# Patient Record
Sex: MALE | Race: WHITE | NOT HISPANIC OR LATINO | Employment: FULL TIME | URBAN - METROPOLITAN AREA
[De-identification: names, ages, dates, MRNs, and addresses within clinical notes are randomized per-mention and may not be internally consistent; named-entity substitution may affect disease eponyms.]

---

## 2023-04-27 ENCOUNTER — OFFICE VISIT (OUTPATIENT)
Dept: PRIMARY CARE | Facility: CLINIC | Age: 53
End: 2023-04-27
Payer: COMMERCIAL

## 2023-04-27 VITALS
TEMPERATURE: 98.7 F | SYSTOLIC BLOOD PRESSURE: 133 MMHG | BODY MASS INDEX: 38.22 KG/M2 | HEIGHT: 70 IN | WEIGHT: 267 LBS | DIASTOLIC BLOOD PRESSURE: 76 MMHG | OXYGEN SATURATION: 94 % | HEART RATE: 66 BPM

## 2023-04-27 DIAGNOSIS — E11.9 TYPE 2 DIABETES MELLITUS WITHOUT COMPLICATION, WITHOUT LONG-TERM CURRENT USE OF INSULIN (MULTI): ICD-10-CM

## 2023-04-27 DIAGNOSIS — Z12.5 SCREENING FOR MALIGNANT NEOPLASM OF PROSTATE: ICD-10-CM

## 2023-04-27 DIAGNOSIS — J01.00 ACUTE NON-RECURRENT MAXILLARY SINUSITIS: Primary | ICD-10-CM

## 2023-04-27 DIAGNOSIS — Z00.00 ROUTINE ADULT HEALTH MAINTENANCE: Primary | ICD-10-CM

## 2023-04-27 PROCEDURE — 3008F BODY MASS INDEX DOCD: CPT | Performed by: NURSE PRACTITIONER

## 2023-04-27 PROCEDURE — 99213 OFFICE O/P EST LOW 20 MIN: CPT | Performed by: NURSE PRACTITIONER

## 2023-04-27 PROCEDURE — 1036F TOBACCO NON-USER: CPT | Performed by: NURSE PRACTITIONER

## 2023-04-27 RX ORDER — TRIAMCINOLONE ACETONIDE 55 UG/1
2 SPRAY, METERED NASAL DAILY
Qty: 16.5 G | Refills: 5 | Status: SHIPPED | OUTPATIENT
Start: 2023-04-27 | End: 2024-04-26

## 2023-04-27 RX ORDER — AMOXICILLIN 875 MG/1
TABLET, FILM COATED ORAL
COMMUNITY
Start: 2023-04-23 | End: 2024-01-09 | Stop reason: ALTCHOICE

## 2023-04-27 RX ORDER — ASPIRIN 81 MG/1
1 TABLET ORAL DAILY
COMMUNITY
Start: 2019-03-12 | End: 2024-03-11 | Stop reason: SDUPTHER

## 2023-04-27 RX ORDER — FLUTICASONE PROPIONATE 50 MCG
1 SPRAY, SUSPENSION (ML) NASAL 2 TIMES DAILY
COMMUNITY
Start: 2017-04-18 | End: 2023-04-27 | Stop reason: ALTCHOICE

## 2023-04-27 RX ORDER — DOXYCYCLINE 100 MG/1
100 CAPSULE ORAL 2 TIMES DAILY
Qty: 20 CAPSULE | Refills: 0 | Status: SHIPPED | OUTPATIENT
Start: 2023-04-27 | End: 2023-05-07

## 2023-04-27 RX ORDER — CETIRIZINE HYDROCHLORIDE 10 MG/1
10 TABLET ORAL
COMMUNITY
Start: 2020-05-21 | End: 2023-04-27 | Stop reason: SDUPTHER

## 2023-04-27 RX ORDER — ATORVASTATIN CALCIUM 80 MG/1
80 TABLET, FILM COATED ORAL
COMMUNITY
Start: 2017-03-10 | End: 2024-03-11 | Stop reason: WASHOUT

## 2023-04-27 RX ORDER — IBUPROFEN 200 MG
CAPSULE ORAL
COMMUNITY
Start: 2021-12-02

## 2023-04-27 RX ORDER — ESCITALOPRAM OXALATE 10 MG/1
10 TABLET ORAL DAILY
COMMUNITY
Start: 2023-02-24 | End: 2024-03-11 | Stop reason: ALTCHOICE

## 2023-04-27 RX ORDER — CETIRIZINE HYDROCHLORIDE 10 MG/1
10 TABLET ORAL
Qty: 30 TABLET | Refills: 5 | Status: SHIPPED | OUTPATIENT
Start: 2023-04-27 | End: 2024-03-11 | Stop reason: ALTCHOICE

## 2023-04-27 RX ORDER — CYCLOBENZAPRINE HCL 10 MG
10 TABLET ORAL 3 TIMES DAILY PRN
COMMUNITY
Start: 2023-03-10 | End: 2024-03-11 | Stop reason: ALTCHOICE

## 2023-04-27 ASSESSMENT — ENCOUNTER SYMPTOMS
FATIGUE: 0
PALPITATIONS: 0
WHEEZING: 0
ABDOMINAL PAIN: 0
EYE REDNESS: 0
COUGH: 0
SHORTNESS OF BREATH: 0
EYE DISCHARGE: 0
HEADACHES: 1
CHILLS: 0
SINUS PRESSURE: 0
MYALGIAS: 1
DIZZINESS: 1
SORE THROAT: 1
FEVER: 0
RHINORRHEA: 1
FACIAL SWELLING: 0
DIARRHEA: 0
CHEST TIGHTNESS: 1
ARTHRALGIAS: 1
VOMITING: 0

## 2023-04-27 ASSESSMENT — PATIENT HEALTH QUESTIONNAIRE - PHQ9
1. LITTLE INTEREST OR PLEASURE IN DOING THINGS: NOT AT ALL
SUM OF ALL RESPONSES TO PHQ9 QUESTIONS 1 AND 2: 0
2. FEELING DOWN, DEPRESSED OR HOPELESS: NOT AT ALL

## 2023-04-27 NOTE — PATIENT INSTRUCTIONS
Stop amoxicillin  Start doxycycline  Dayquil  Zyrtec   Nasacort  Fluids  Rest  Ibuprofen  Call if sx worsen or not starting to get better in 2-3d    Return for wellness appt      I will communicate with you via [facility] regarding messages and results. If you need help with this, you can call the support line at [phone].    IT WAS A PLEASURE TO SEE YOU TODAY. THANK YOU FOR CHOOSING US FOR YOUR HEALTHCARE NEEDS.

## 2023-04-27 NOTE — PROGRESS NOTES
Subjective   Patient ID: Benjamín Sahu is a 53 y.o. male who presents for Nasal Congestion (Pt states he has congestion in his chest, runny nose, cough is dry, felt like fire was in his chest and moved up to his throat. Headache with pressure in left ear. Went to Urgent Care 04/21  on [address] Tried cough drops, lemon tea, nothing is working. Yellowish mucus in coming out his nose, very concern).  Last physical: due    HPI  Chest congestion, stuffy nose, runny nose-yellow, cough dry, feels like fire in chst to throat, HA, lf ear pressure, body aches, dizziness, ST since 4/19/23  Went to UC 4/21/23. Given amox. Covid test neg. Strep neg.    no sob, wheezing, tight upper chest, fever, chills, new loss of taste or smell, diarrhea, vomiting, nausea, abdominal pain, fatigue, weakness, red eye, rash, bruising, cyanosis, ear pain, post nasal drip, pain with deep breath, leg or foot swelling, calf pain  loss of appetite-yes, due to dry throat  fluids-yes  has urinated at least 3 times in 24hrs  Seasonal allergies-yes  Selftxt-cough drops, lemon tea, dayquil, nasal spray, breathing in salt water, vicks    No known exposure to COVID-19  No known exposure to strep  No known exposure to influenza  No one sick around the pt      Risk factors:  Chronic disease/comorbidities: cad, dm, obesity, glen  not a healthcare worker  Age: not 65yrs of age and older      No care team member to display     Review of Systems   Constitutional:  Negative for chills, fatigue and fever.   HENT:  Positive for congestion, rhinorrhea and sore throat. Negative for ear discharge, ear pain, facial swelling, postnasal drip and sinus pressure.         Lf ear pressure     Eyes:  Negative for discharge and redness.   Respiratory:  Positive for chest tightness. Negative for cough, shortness of breath and wheezing.    Cardiovascular:  Negative for chest pain, palpitations and leg swelling.   Gastrointestinal:  Negative for abdominal pain, diarrhea and  vomiting.   Musculoskeletal:  Positive for arthralgias and myalgias.   Skin:  Negative for rash.   Neurological:  Positive for dizziness and headaches.       Objective   Visit Vitals  /76   Pulse 66   Temp 37.1 °C (98.7 °F) (Oral)      BP Readings from Last 3 Encounters:   04/27/23 133/76   11/09/22 128/80   11/03/22 122/77     Wt Readings from Last 3 Encounters:   04/27/23 121 kg (267 lb)   11/21/22 123 kg (271 lb)   11/09/22 122 kg (268 lb)       Physical Exam  Constitutional:       Appearance: Normal appearance.   HENT:      Head: Normocephalic.      Right Ear: Tympanic membrane, ear canal and external ear normal.      Left Ear: Tympanic membrane, ear canal and external ear normal.      Nose: Nose normal.      Mouth/Throat:      Mouth: Mucous membranes are moist.      Pharynx: No oropharyngeal exudate or posterior oropharyngeal erythema.   Cardiovascular:      Rate and Rhythm: Normal rate and regular rhythm.      Heart sounds: Normal heart sounds.   Pulmonary:      Effort: Pulmonary effort is normal.      Breath sounds: Normal breath sounds. No wheezing, rhonchi or rales.   Lymphadenopathy:      Cervical: No cervical adenopathy.   Neurological:      Mental Status: He is alert.       Assessment/Plan   Diagnoses and all orders for this visit:  Acute non-recurrent maxillary sinusitis  -     doxycycline (Vibramycin) 100 mg capsule; Take 1 capsule (100 mg) by mouth 2 times a day for 10 days. Take with at least 8 ounces (large glass) of water, do not lie down for 30 minutes after

## 2023-09-27 PROBLEM — J38.3 DISORDER OF VOCAL CORDS: Status: ACTIVE | Noted: 2020-12-30

## 2023-09-27 PROBLEM — D22.60 MELANOCYTIC NEVI OF UNSPECIFIED UPPER LIMB, INCLUDING SHOULDER: Status: ACTIVE | Noted: 2021-02-01

## 2023-09-27 PROBLEM — E78.5 DYSLIPIDEMIA: Status: ACTIVE | Noted: 2021-06-03

## 2023-09-27 PROBLEM — I20.9 ANGINA PECTORIS (CMS-HCC): Status: ACTIVE | Noted: 2023-09-27

## 2023-09-27 PROBLEM — M25.579 CHRONIC ANKLE PAIN: Status: ACTIVE | Noted: 2023-09-27

## 2023-09-27 PROBLEM — G47.33 OSA (OBSTRUCTIVE SLEEP APNEA): Status: ACTIVE | Noted: 2021-06-03

## 2023-09-27 PROBLEM — J35.8 TONSIL ASYMMETRY: Status: ACTIVE | Noted: 2020-12-30

## 2023-09-27 PROBLEM — Q66.70 PES CAVUS: Status: ACTIVE | Noted: 2023-09-27

## 2023-09-27 PROBLEM — D84.9 IMMUNE DEFICIENCY DISORDER (MULTI): Status: ACTIVE | Noted: 2023-09-27

## 2023-09-27 PROBLEM — E66.9 OBESITY (BMI 30-39.9): Status: ACTIVE | Noted: 2023-04-10

## 2023-09-27 PROBLEM — F41.1 GENERALIZED ANXIETY DISORDER: Status: ACTIVE | Noted: 2021-07-14

## 2023-09-27 PROBLEM — R06.00 PND (PAROXYSMAL NOCTURNAL DYSPNEA): Status: ACTIVE | Noted: 2021-06-03

## 2023-09-27 PROBLEM — Z96.22 MYRINGOTOMY TUBE(S) STATUS: Status: ACTIVE | Noted: 2023-09-27

## 2023-09-27 PROBLEM — G56.03 CARPAL TUNNEL SYNDROME, BILATERAL: Status: ACTIVE | Noted: 2023-09-27

## 2023-09-27 PROBLEM — K92.2 GASTROINTESTINAL HEMORRHAGE: Status: ACTIVE | Noted: 2022-02-28

## 2023-09-27 PROBLEM — H92.01 OTALGIA, RIGHT EAR: Status: ACTIVE | Noted: 2023-09-27

## 2023-09-27 PROBLEM — G43.109 MIGRAINE WITH VISUAL AURA: Status: ACTIVE | Noted: 2023-09-27

## 2023-09-27 PROBLEM — Z86.010 PERSONAL HISTORY OF COLONIC POLYPS: Status: ACTIVE | Noted: 2021-06-03

## 2023-09-27 PROBLEM — J31.0 CHRONIC RHINITIS: Status: ACTIVE | Noted: 2023-09-27

## 2023-09-27 PROBLEM — H70.10 CHRONIC MASTOIDITIS: Status: ACTIVE | Noted: 2017-05-02

## 2023-09-27 PROBLEM — H25.099 INCIPIENT SENILE CATARACT: Status: ACTIVE | Noted: 2023-09-27

## 2023-09-27 PROBLEM — R26.89 ANTALGIC GAIT: Status: ACTIVE | Noted: 2023-09-27

## 2023-09-27 PROBLEM — F32.A DEPRESSION: Status: ACTIVE | Noted: 2023-09-27

## 2023-09-27 PROBLEM — G89.29 CHRONIC ANKLE PAIN: Status: ACTIVE | Noted: 2023-09-27

## 2023-09-27 PROBLEM — J30.9 ALLERGIC RHINITIS: Status: ACTIVE | Noted: 2023-09-27

## 2023-09-27 PROBLEM — M47.812 CERVICAL SPONDYLOSIS WITHOUT MYELOPATHY: Status: ACTIVE | Noted: 2023-09-27

## 2023-09-27 PROBLEM — I69.30 HISTORY OF STROKE WITH RESIDUAL EFFECTS: Status: ACTIVE | Noted: 2023-09-27

## 2023-09-27 PROBLEM — M26.629 TMJ PAIN DYSFUNCTION SYNDROME: Status: ACTIVE | Noted: 2023-09-27

## 2023-09-27 PROBLEM — G50.9 TRIGEMINAL NEUROPATHY: Status: ACTIVE | Noted: 2023-09-27

## 2023-09-27 PROBLEM — R29.898 IMPAIRED STRENGTH OF LOWER EXTREMITY: Status: ACTIVE | Noted: 2023-09-27

## 2023-09-27 PROBLEM — H93.12 SUBJECTIVE TINNITUS OF LEFT EAR: Status: ACTIVE | Noted: 2023-09-27

## 2023-09-27 PROBLEM — L57.9 SKIN CHANGES DUE TO CHRONIC EXPOSURE TO NONIONIZING RADIATION, UNSPECIFIED: Status: ACTIVE | Noted: 2021-02-01

## 2023-09-27 PROBLEM — L70.0 ACNE VULGARIS: Status: ACTIVE | Noted: 2021-02-01

## 2023-09-27 PROBLEM — D22.39 MELANOCYTIC NEVI OF OTHER PARTS OF FACE: Status: ACTIVE | Noted: 2021-02-01

## 2023-09-27 PROBLEM — D22.5 MELANOCYTIC NEVI OF TRUNK: Status: ACTIVE | Noted: 2021-02-01

## 2023-09-27 PROBLEM — I25.10 CORONARY ARTERY CALCIFICATION SEEN ON CT SCAN: Status: ACTIVE | Noted: 2023-09-27

## 2023-09-27 PROBLEM — R29.898: Status: ACTIVE | Noted: 2023-09-27

## 2023-09-27 PROBLEM — J30.0 VMR (VASOMOTOR RHINITIS): Status: ACTIVE | Noted: 2023-09-27

## 2023-09-27 PROBLEM — K63.5 COLON POLYP: Status: ACTIVE | Noted: 2019-04-18

## 2023-09-27 PROBLEM — M25.672 DECREASED RANGE OF MOTION OF LEFT ANKLE: Status: ACTIVE | Noted: 2023-09-27

## 2023-09-27 PROBLEM — M99.09 SEGMENTAL AND SOMATIC DYSFUNCTION: Status: ACTIVE | Noted: 2023-09-27

## 2023-09-27 PROBLEM — L92.9 GRANULOMA OF SKIN: Status: ACTIVE | Noted: 2023-09-27

## 2023-09-27 PROBLEM — M25.373 ANKLE INSTABILITY: Status: ACTIVE | Noted: 2023-09-27

## 2023-09-27 PROBLEM — H47.20 OPTIC ATROPHY OF RIGHT EYE: Status: ACTIVE | Noted: 2023-09-27

## 2023-09-27 PROBLEM — I25.10 ATHEROSCLEROSIS OF CORONARY ARTERY: Status: ACTIVE | Noted: 2021-06-03

## 2023-09-27 PROBLEM — E66.01 MORBID OBESITY (MULTI): Status: ACTIVE | Noted: 2023-09-27

## 2023-09-27 PROBLEM — M79.2 NEURITIS: Status: ACTIVE | Noted: 2023-09-27

## 2023-09-27 PROBLEM — M26.621 ARTHRALGIA OF RIGHT TEMPOROMANDIBULAR JOINT: Status: ACTIVE | Noted: 2017-05-02

## 2023-09-27 PROBLEM — G25.89 SCAPULAR DYSKINESIS: Status: ACTIVE | Noted: 2023-09-27

## 2023-09-27 PROBLEM — H90.0 CONDUCTIVE HEARING LOSS, BILATERAL: Status: ACTIVE | Noted: 2023-09-27

## 2023-09-27 PROBLEM — H66.93 CHRONIC OTITIS MEDIA OF BOTH EARS: Status: ACTIVE | Noted: 2023-09-27

## 2023-09-27 PROBLEM — E66.9 DIABETES MELLITUS TYPE 2 IN OBESE: Status: ACTIVE | Noted: 2023-09-27

## 2023-09-27 PROBLEM — H90.72 MIXED HEARING LOSS OF LEFT EAR: Status: ACTIVE | Noted: 2023-09-27

## 2023-09-27 PROBLEM — L98.0 PYOGENIC GRANULOMA: Status: ACTIVE | Noted: 2023-09-27

## 2023-09-27 PROBLEM — J32.9 CHRONIC SINUSITIS: Status: ACTIVE | Noted: 2023-09-27

## 2023-09-27 PROBLEM — L82.1 OTHER SEBORRHEIC KERATOSIS: Status: ACTIVE | Noted: 2021-02-01

## 2023-09-27 PROBLEM — H69.93 ETD (EUSTACHIAN TUBE DYSFUNCTION), BILATERAL: Status: ACTIVE | Noted: 2017-05-02

## 2023-09-27 PROBLEM — D48.5 NEOPLASM OF UNCERTAIN BEHAVIOR OF SKIN: Status: ACTIVE | Noted: 2021-02-01

## 2023-09-27 PROBLEM — E11.69 DIABETES MELLITUS TYPE 2 IN OBESE: Status: ACTIVE | Noted: 2023-09-27

## 2023-09-27 PROBLEM — L01.00 IMPETIGO: Status: ACTIVE | Noted: 2023-09-27

## 2023-09-27 PROBLEM — K21.9 GERD (GASTROESOPHAGEAL REFLUX DISEASE): Status: ACTIVE | Noted: 2019-04-18

## 2023-09-27 PROBLEM — M71.349 OTHER BURSAL CYST, UNSPECIFIED HAND: Status: ACTIVE | Noted: 2021-02-01

## 2023-09-27 PROBLEM — H47.011 ISCHEMIC OPTIC NEUROPATHY, RIGHT: Status: ACTIVE | Noted: 2023-09-27

## 2023-09-27 PROBLEM — H71.92 CHOLESTEATOMA, LEFT: Status: ACTIVE | Noted: 2023-09-27

## 2023-09-27 PROBLEM — Z86.0100 PERSONAL HISTORY OF COLONIC POLYPS: Status: ACTIVE | Noted: 2021-06-03

## 2023-09-27 PROBLEM — R94.39 ABNORMAL STRESS TEST: Status: ACTIVE | Noted: 2023-09-27

## 2023-09-27 PROBLEM — G89.29 CHRONIC PAIN: Status: ACTIVE | Noted: 2023-09-27

## 2023-09-27 PROBLEM — Z90.89 ACQUIRED ABSENCE OF OTHER ORGANS: Status: ACTIVE | Noted: 2017-05-21

## 2023-09-27 PROBLEM — F10.11 ALCOHOL ABUSE, IN REMISSION: Status: ACTIVE | Noted: 2021-09-30

## 2023-09-27 PROBLEM — G96.00 CSF LEAK: Status: ACTIVE | Noted: 2023-09-27

## 2023-09-27 RX ORDER — METOPROLOL SUCCINATE 25 MG/1
25 TABLET, EXTENDED RELEASE ORAL
COMMUNITY
Start: 2021-07-30 | End: 2024-03-11 | Stop reason: ALTCHOICE

## 2023-09-27 RX ORDER — IBUPROFEN 600 MG/1
600 TABLET ORAL AS NEEDED
COMMUNITY
Start: 2019-04-02 | End: 2024-03-11 | Stop reason: ALTCHOICE

## 2023-09-27 RX ORDER — ACETAMINOPHEN 500 MG
1 TABLET ORAL NIGHTLY
COMMUNITY
Start: 2023-04-10

## 2023-09-27 RX ORDER — OLOPATADINE HYDROCHLORIDE 2 MG/ML
SOLUTION OPHTHALMIC
COMMUNITY
Start: 2023-06-22

## 2023-09-27 RX ORDER — KETOCONAZOLE 20 MG/G
CREAM TOPICAL 2 TIMES DAILY
COMMUNITY
Start: 2018-10-17

## 2023-09-27 RX ORDER — TRETINOIN 0.5 MG/G
CREAM TOPICAL NIGHTLY
COMMUNITY
Start: 2023-08-07 | End: 2024-03-11 | Stop reason: ALTCHOICE

## 2023-09-27 RX ORDER — IBUPROFEN 800 MG/1
800 TABLET ORAL EVERY 8 HOURS PRN
COMMUNITY
End: 2024-03-11 | Stop reason: ALTCHOICE

## 2023-09-27 RX ORDER — NAPROXEN 500 MG/1
500 TABLET ORAL
COMMUNITY
End: 2024-03-11 | Stop reason: ALTCHOICE

## 2023-09-27 RX ORDER — MUPIROCIN 20 MG/G
OINTMENT TOPICAL
COMMUNITY
Start: 2023-07-08 | End: 2024-03-11 | Stop reason: ALTCHOICE

## 2023-09-27 RX ORDER — ISOSORBIDE MONONITRATE 30 MG/1
1 TABLET, EXTENDED RELEASE ORAL DAILY
COMMUNITY
Start: 2017-04-07 | End: 2024-03-11 | Stop reason: ALTCHOICE

## 2023-09-27 RX ORDER — DOXYCYCLINE 100 MG/1
100 CAPSULE ORAL 2 TIMES DAILY
COMMUNITY
Start: 2020-11-17 | End: 2024-01-09 | Stop reason: ALTCHOICE

## 2023-09-27 RX ORDER — MELOXICAM 7.5 MG/1
7.5 TABLET ORAL
COMMUNITY
Start: 2023-03-28 | End: 2024-03-11 | Stop reason: ALTCHOICE

## 2023-09-27 RX ORDER — ROSUVASTATIN CALCIUM 40 MG/1
1 TABLET, COATED ORAL DAILY
COMMUNITY
Start: 2023-02-24 | End: 2024-03-11 | Stop reason: ALTCHOICE

## 2023-09-27 RX ORDER — NITROGLYCERIN 0.4 MG/1
0.4 TABLET SUBLINGUAL
COMMUNITY
Start: 2018-01-02

## 2023-09-27 RX ORDER — CEFDINIR 300 MG/1
300 CAPSULE ORAL
COMMUNITY
Start: 2017-05-10 | End: 2024-03-11 | Stop reason: ALTCHOICE

## 2023-09-27 RX ORDER — METFORMIN HYDROCHLORIDE 500 MG/1
4 TABLET, EXTENDED RELEASE ORAL
COMMUNITY
Start: 2021-01-14 | End: 2024-03-11 | Stop reason: ALTCHOICE

## 2023-09-27 RX ORDER — CLINDAMYCIN PHOSPHATE 10 UG/ML
LOTION TOPICAL
COMMUNITY
Start: 2021-02-01

## 2023-09-27 RX ORDER — SUCRALFATE 1 G/1
1 TABLET ORAL 4 TIMES DAILY
COMMUNITY
Start: 2017-02-03 | End: 2024-03-11 | Stop reason: ALTCHOICE

## 2023-09-27 RX ORDER — TIZANIDINE 2 MG/1
2 TABLET ORAL EVERY 8 HOURS
COMMUNITY
Start: 2022-04-22 | End: 2024-03-11 | Stop reason: ALTCHOICE

## 2023-09-27 RX ORDER — PANTOPRAZOLE SODIUM 40 MG/1
40 TABLET, DELAYED RELEASE ORAL 2 TIMES DAILY
COMMUNITY
End: 2024-03-11 | Stop reason: ALTCHOICE

## 2023-09-27 RX ORDER — ESCITALOPRAM OXALATE 20 MG/1
20 TABLET ORAL
COMMUNITY
Start: 2023-05-16 | End: 2024-03-11 | Stop reason: ALTCHOICE

## 2023-09-27 RX ORDER — PANTOPRAZOLE SODIUM 20 MG/1
20 TABLET, DELAYED RELEASE ORAL
COMMUNITY

## 2023-10-17 DIAGNOSIS — J01.00 ACUTE NON-RECURRENT MAXILLARY SINUSITIS: ICD-10-CM

## 2023-10-17 RX ORDER — FLUTICASONE PROPIONATE 50 MCG
SPRAY, SUSPENSION (ML) NASAL
Qty: 48 G | Refills: 11 | Status: SHIPPED | OUTPATIENT
Start: 2023-10-17 | End: 2024-02-05 | Stop reason: SDUPTHER

## 2023-10-27 ENCOUNTER — ALLIED HEALTH (OUTPATIENT)
Dept: INTEGRATIVE MEDICINE | Facility: CLINIC | Age: 53
End: 2023-10-27
Payer: COMMERCIAL

## 2023-10-27 DIAGNOSIS — G25.89 SCAPULAR DYSKINESIS: ICD-10-CM

## 2023-10-27 DIAGNOSIS — M99.02 SEGMENTAL AND SOMATIC DYSFUNCTION OF THORACIC REGION: ICD-10-CM

## 2023-10-27 DIAGNOSIS — M26.629 TMJ PAIN DYSFUNCTION SYNDROME: ICD-10-CM

## 2023-10-27 DIAGNOSIS — M99.01 SEGMENTAL DYSFUNCTION OF CERVICAL REGION: Primary | ICD-10-CM

## 2023-10-27 DIAGNOSIS — M99.00 SEGMENTAL DYSFUNCTION OF HEAD REGION: ICD-10-CM

## 2023-10-27 DIAGNOSIS — M47.812 CERVICAL SPONDYLOSIS WITHOUT MYELOPATHY: ICD-10-CM

## 2023-10-27 PROCEDURE — 98941 CHIROPRACT MANJ 3-4 REGIONS: CPT | Performed by: CHIROPRACTOR

## 2023-10-27 NOTE — PROGRESS NOTES
Subjective   Patient ID: Benjamín Sahu is a 53 y.o. male who presents October 27, 2023 for neck, upper back and jaw pain.    (8/15) [facility]    Today, the patient rates their degree of pain as a 5 out of 10 on the numeric pain rating scale.     HPI - Benjamín returns to my office for chiropractic care with main complaints of neck and jaw pain. He has been under a lot of stress recently with changes to his job and financial situation. He reports that due to this, he noticed he has been clenching and grinding his jaw more. He reports bilateral jaw tension and bilateral neck discomfort. No radicular complaints reported. Denies new trauma/incident.    No other changes in health reported.       Objective   Physical Exam  Neurological:      General: No focal deficit present.      Mental Status: He is alert and oriented to person, place, and time.      Cranial Nerves: No dysarthria or facial asymmetry.      Sensory: Sensation is intact.      Motor: Motor function is intact.      Coordination: Coordination is intact.      Gait: Gait is intact.         Palpation of the following region(s) revealed:  Cervical: Suboccipitals bilateral, hypertonicity and tenderness.  Temporalis bilateral, muscular hypertonicity.  SCM right, hypertonicity and tenderness.  Upper trapezius bilateral, hypertonicity and tenderness.  Cervical paraspinals bilateral, hypertonicity and tenderness.  Muscles of mastication right, hypertonicity and tenderness.  Thoracic: Middle trapezius bilateral, muscular hypertonicity.  Rhomboids bilateral, muscular hypertonicity.  Pectoralis bilateral, muscular hypertonicity.          Segmental Joint(s): Segmental joint dysfunction was assessed with motion palpation and is identified in the following areas:  Cervical : C0 C1 C5  Thoracic : T2., T3, T5, and T6      Assessment/Plan   Today's Treatment Included: Chiropractic manipulation to the Segmental Joint(s) Cervical : C0 C1 C5  Segmental Joint(s) Thoracic : T2., T3, and T6    Treatment Techniques Used : Activator/Tool assisted technique, Manual traction, and Low force  Integrative Dry Needling (IDN) - Needles in / out:  3.  IDN: BL suboccipitals, R masseter    Soft-tissue mobilization was performed in the following areas:   Suboccipital bilateral, Cervical paraspinal mm. bilateral, SCM bilateral, Upper Trapezius bilateral, Temporalis bilateral, and Masseter bilateral  Levator Scap. bilateral, Rhomboids bilateral, and Pectoralis bilateral              F/U in 3-4 weeks or as-needed    The patient tolerated today's treatment with little or no additional discomfort and was instructed to contact the office for questions or concerns.

## 2023-11-05 ENCOUNTER — OFFICE VISIT (OUTPATIENT)
Dept: URGENT CARE | Facility: CLINIC | Age: 53
End: 2023-11-05
Payer: COMMERCIAL

## 2023-11-05 VITALS
HEART RATE: 63 BPM | TEMPERATURE: 97.8 F | SYSTOLIC BLOOD PRESSURE: 148 MMHG | BODY MASS INDEX: 37.46 KG/M2 | OXYGEN SATURATION: 96 % | DIASTOLIC BLOOD PRESSURE: 77 MMHG | WEIGHT: 250 LBS | RESPIRATION RATE: 20 BRPM

## 2023-11-05 DIAGNOSIS — J01.90 ACUTE SINUSITIS, RECURRENCE NOT SPECIFIED, UNSPECIFIED LOCATION: Primary | ICD-10-CM

## 2023-11-05 PROCEDURE — 1036F TOBACCO NON-USER: CPT | Performed by: PHYSICIAN ASSISTANT

## 2023-11-05 PROCEDURE — 3078F DIAST BP <80 MM HG: CPT | Performed by: PHYSICIAN ASSISTANT

## 2023-11-05 PROCEDURE — 3008F BODY MASS INDEX DOCD: CPT | Performed by: PHYSICIAN ASSISTANT

## 2023-11-05 PROCEDURE — 3077F SYST BP >= 140 MM HG: CPT | Performed by: PHYSICIAN ASSISTANT

## 2023-11-05 PROCEDURE — 99203 OFFICE O/P NEW LOW 30 MIN: CPT | Performed by: PHYSICIAN ASSISTANT

## 2023-11-05 RX ORDER — AMOXICILLIN AND CLAVULANATE POTASSIUM 875; 125 MG/1; MG/1
1 TABLET, FILM COATED ORAL 2 TIMES DAILY
Qty: 20 TABLET | Refills: 0 | Status: SHIPPED | OUTPATIENT
Start: 2023-11-05 | End: 2023-11-15

## 2023-11-05 ASSESSMENT — ENCOUNTER SYMPTOMS
HEMATOLOGIC/LYMPHATIC NEGATIVE: 1
GASTROINTESTINAL NEGATIVE: 1
COUGH: 1
SINUS PAIN: 1
EYES NEGATIVE: 1
ENDOCRINE NEGATIVE: 1
NEUROLOGICAL NEGATIVE: 1
FEVER: 0
ALLERGIC/IMMUNOLOGIC NEGATIVE: 1
SORE THROAT: 0
SINUS COMPLAINT: 1
PSYCHIATRIC NEGATIVE: 1
CARDIOVASCULAR NEGATIVE: 1
SINUS PRESSURE: 1
MUSCULOSKELETAL NEGATIVE: 1

## 2023-11-05 ASSESSMENT — PAIN SCALES - GENERAL: PAINLEVEL: 0-NO PAIN
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
## 2023-11-05 NOTE — PATIENT INSTRUCTIONS
Increase clear fluids  OTC decongestant as directed  Pcp follow up this week if not improving or worsening  ER visit anytime 24/7 for acute worsening or changing condition

## 2023-11-21 ENCOUNTER — OFFICE VISIT (OUTPATIENT)
Dept: PRIMARY CARE | Facility: CLINIC | Age: 53
End: 2023-11-21
Payer: COMMERCIAL

## 2023-11-21 ENCOUNTER — OFFICE VISIT (OUTPATIENT)
Dept: URGENT CARE | Facility: CLINIC | Age: 53
End: 2023-11-21
Payer: COMMERCIAL

## 2023-11-21 VITALS
HEART RATE: 74 BPM | SYSTOLIC BLOOD PRESSURE: 163 MMHG | RESPIRATION RATE: 14 BRPM | BODY MASS INDEX: 38.36 KG/M2 | TEMPERATURE: 97.8 F | HEIGHT: 69 IN | OXYGEN SATURATION: 96 % | WEIGHT: 259 LBS | DIASTOLIC BLOOD PRESSURE: 90 MMHG

## 2023-11-21 DIAGNOSIS — J02.9 PHARYNGITIS, UNSPECIFIED ETIOLOGY: ICD-10-CM

## 2023-11-21 DIAGNOSIS — Z00.00 ANNUAL PHYSICAL EXAM: Primary | ICD-10-CM

## 2023-11-21 DIAGNOSIS — I88.9 CERVICAL ADENITIS: Primary | ICD-10-CM

## 2023-11-21 DIAGNOSIS — J02.9 SORE THROAT: ICD-10-CM

## 2023-11-21 LAB
POC RAPID STREP: NEGATIVE
POC SARS-COV-2 AG: NORMAL

## 2023-11-21 PROCEDURE — 3080F DIAST BP >= 90 MM HG: CPT | Performed by: PHYSICIAN ASSISTANT

## 2023-11-21 PROCEDURE — 1036F TOBACCO NON-USER: CPT | Performed by: FAMILY MEDICINE

## 2023-11-21 PROCEDURE — 3008F BODY MASS INDEX DOCD: CPT | Performed by: FAMILY MEDICINE

## 2023-11-21 PROCEDURE — 3008F BODY MASS INDEX DOCD: CPT | Performed by: PHYSICIAN ASSISTANT

## 2023-11-21 PROCEDURE — 3077F SYST BP >= 140 MM HG: CPT | Performed by: PHYSICIAN ASSISTANT

## 2023-11-21 PROCEDURE — 99214 OFFICE O/P EST MOD 30 MIN: CPT | Performed by: PHYSICIAN ASSISTANT

## 2023-11-21 PROCEDURE — 87880 STREP A ASSAY W/OPTIC: CPT | Performed by: PHYSICIAN ASSISTANT

## 2023-11-21 PROCEDURE — 87426 SARSCOV CORONAVIRUS AG IA: CPT | Performed by: PHYSICIAN ASSISTANT

## 2023-11-21 PROCEDURE — 1036F TOBACCO NON-USER: CPT | Performed by: PHYSICIAN ASSISTANT

## 2023-11-21 RX ORDER — METHYLPREDNISOLONE 4 MG/1
TABLET ORAL
Qty: 21 TABLET | Refills: 0 | Status: SHIPPED | OUTPATIENT
Start: 2023-11-21 | End: 2023-11-28

## 2023-11-21 ASSESSMENT — ENCOUNTER SYMPTOMS
GASTROINTESTINAL NEGATIVE: 1
ALLERGIC/IMMUNOLOGIC NEGATIVE: 1
FEVER: 0
PSYCHIATRIC NEGATIVE: 1
HEADACHES: 1
ENDOCRINE NEGATIVE: 1
SINUS PRESSURE: 1
CARDIOVASCULAR NEGATIVE: 1
EYES NEGATIVE: 1
SORE THROAT: 1
ADENOPATHY: 1
MUSCULOSKELETAL NEGATIVE: 1
COUGH: 0

## 2023-11-21 ASSESSMENT — PAIN SCALES - GENERAL: PAINLEVEL: 4
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
## 2023-11-21 NOTE — PATIENT INSTRUCTIONS
Soft diet and increase clear fluids  Pcp or ENT follow up if not improving or worsening  ER visit anytime 24/7 for acute worsening or changing condition

## 2023-11-24 ENCOUNTER — TELEPHONE (OUTPATIENT)
Dept: URGENT CARE | Facility: CLINIC | Age: 53
End: 2023-11-24
Payer: COMMERCIAL

## 2023-11-24 DIAGNOSIS — I88.9 ADENITIS: Primary | ICD-10-CM

## 2023-11-24 RX ORDER — METHYLPREDNISOLONE 4 MG/1
TABLET ORAL
Qty: 21 TABLET | Refills: 0 | Status: SHIPPED | OUTPATIENT
Start: 2023-11-24 | End: 2023-12-01

## 2023-11-24 NOTE — TELEPHONE ENCOUNTER
Pt called. He states he lost his medrol dose digna given at visit 3 days ago. I sent in another one,

## 2024-01-05 ENCOUNTER — TELEPHONE (OUTPATIENT)
Dept: OTOLARYNGOLOGY | Facility: HOSPITAL | Age: 54
End: 2024-01-05
Payer: COMMERCIAL

## 2024-01-05 DIAGNOSIS — H66.93 CHRONIC OTITIS MEDIA OF BOTH EARS: ICD-10-CM

## 2024-01-05 RX ORDER — CIPROFLOXACIN AND DEXAMETHASONE 3; 1 MG/ML; MG/ML
4 SUSPENSION/ DROPS AURICULAR (OTIC) 2 TIMES DAILY
Qty: 7.5 ML | Refills: 1 | Status: SHIPPED | OUTPATIENT
Start: 2024-01-05 | End: 2024-01-12

## 2024-01-09 ENCOUNTER — LAB (OUTPATIENT)
Dept: LAB | Facility: LAB | Age: 54
End: 2024-01-09
Payer: COMMERCIAL

## 2024-01-09 ENCOUNTER — OFFICE VISIT (OUTPATIENT)
Dept: PRIMARY CARE | Facility: CLINIC | Age: 54
End: 2024-01-09
Payer: COMMERCIAL

## 2024-01-09 ENCOUNTER — TELEPHONE (OUTPATIENT)
Dept: PRIMARY CARE | Facility: CLINIC | Age: 54
End: 2024-01-09

## 2024-01-09 VITALS
HEART RATE: 76 BPM | WEIGHT: 272 LBS | BODY MASS INDEX: 40.29 KG/M2 | SYSTOLIC BLOOD PRESSURE: 119 MMHG | DIASTOLIC BLOOD PRESSURE: 73 MMHG | TEMPERATURE: 98.2 F | HEIGHT: 69 IN

## 2024-01-09 DIAGNOSIS — Z00.00 ANNUAL PHYSICAL EXAM: ICD-10-CM

## 2024-01-09 DIAGNOSIS — R68.89 FLU-LIKE SYMPTOMS: Primary | ICD-10-CM

## 2024-01-09 LAB
ALBUMIN SERPL BCP-MCNC: 3.8 G/DL (ref 3.4–5)
ALP SERPL-CCNC: 48 U/L (ref 33–120)
ALT SERPL W P-5'-P-CCNC: 20 U/L (ref 10–52)
ANION GAP SERPL CALC-SCNC: 13 MMOL/L (ref 10–20)
AST SERPL W P-5'-P-CCNC: 18 U/L (ref 9–39)
BILIRUB SERPL-MCNC: 0.4 MG/DL (ref 0–1.2)
BUN SERPL-MCNC: 15 MG/DL (ref 6–23)
CALCIUM SERPL-MCNC: 8.6 MG/DL (ref 8.6–10.6)
CHLORIDE SERPL-SCNC: 101 MMOL/L (ref 98–107)
CHOLEST SERPL-MCNC: 147 MG/DL (ref 0–199)
CHOLESTEROL/HDL RATIO: 3.2
CO2 SERPL-SCNC: 26 MMOL/L (ref 21–32)
CREAT SERPL-MCNC: 0.8 MG/DL (ref 0.5–1.3)
EGFRCR SERPLBLD CKD-EPI 2021: >90 ML/MIN/1.73M*2
EST. AVERAGE GLUCOSE BLD GHB EST-MCNC: 143 MG/DL
GLUCOSE SERPL-MCNC: 117 MG/DL (ref 74–99)
HBA1C MFR BLD: 6.6 %
HCV AB SER QL: NONREACTIVE
HDLC SERPL-MCNC: 46.6 MG/DL
LDLC SERPL CALC-MCNC: 80 MG/DL
NON HDL CHOLESTEROL: 100 MG/DL (ref 0–149)
POC RAPID INFLUENZA A: NEGATIVE
POC RAPID INFLUENZA B: NEGATIVE
POTASSIUM SERPL-SCNC: 4 MMOL/L (ref 3.5–5.3)
PROT SERPL-MCNC: 6.6 G/DL (ref 6.4–8.2)
SODIUM SERPL-SCNC: 136 MMOL/L (ref 136–145)
TRIGL SERPL-MCNC: 100 MG/DL (ref 0–149)
VLDL: 20 MG/DL (ref 0–40)

## 2024-01-09 PROCEDURE — 3044F HG A1C LEVEL LT 7.0%: CPT | Performed by: FAMILY MEDICINE

## 2024-01-09 PROCEDURE — 3048F LDL-C <100 MG/DL: CPT | Performed by: FAMILY MEDICINE

## 2024-01-09 PROCEDURE — 86803 HEPATITIS C AB TEST: CPT

## 2024-01-09 PROCEDURE — 80061 LIPID PANEL: CPT

## 2024-01-09 PROCEDURE — 3008F BODY MASS INDEX DOCD: CPT | Performed by: FAMILY MEDICINE

## 2024-01-09 PROCEDURE — 87804 INFLUENZA ASSAY W/OPTIC: CPT | Performed by: FAMILY MEDICINE

## 2024-01-09 PROCEDURE — 80053 COMPREHEN METABOLIC PANEL: CPT

## 2024-01-09 PROCEDURE — 83036 HEMOGLOBIN GLYCOSYLATED A1C: CPT

## 2024-01-09 PROCEDURE — 1036F TOBACCO NON-USER: CPT | Performed by: FAMILY MEDICINE

## 2024-01-09 PROCEDURE — 3074F SYST BP LT 130 MM HG: CPT | Performed by: FAMILY MEDICINE

## 2024-01-09 PROCEDURE — 3078F DIAST BP <80 MM HG: CPT | Performed by: FAMILY MEDICINE

## 2024-01-09 PROCEDURE — 99213 OFFICE O/P EST LOW 20 MIN: CPT | Performed by: FAMILY MEDICINE

## 2024-01-09 PROCEDURE — 87635 SARS-COV-2 COVID-19 AMP PRB: CPT

## 2024-01-09 PROCEDURE — 36415 COLL VENOUS BLD VENIPUNCTURE: CPT

## 2024-01-09 RX ORDER — BROMPHENIRAMINE MALEATE, PSEUDOEPHEDRINE HYDROCHLORIDE, AND DEXTROMETHORPHAN HYDROBROMIDE 2; 30; 10 MG/5ML; MG/5ML; MG/5ML
5 SYRUP ORAL 4 TIMES DAILY PRN
Qty: 120 ML | Refills: 0 | Status: SHIPPED | OUTPATIENT
Start: 2024-01-09 | End: 2024-01-19

## 2024-01-09 RX ORDER — CEFDINIR 300 MG/1
300 CAPSULE ORAL 2 TIMES DAILY
Qty: 20 CAPSULE | Refills: 0 | Status: SHIPPED | OUTPATIENT
Start: 2024-01-09 | End: 2024-01-19

## 2024-01-09 ASSESSMENT — PATIENT HEALTH QUESTIONNAIRE - PHQ9
1. LITTLE INTEREST OR PLEASURE IN DOING THINGS: NOT AT ALL
2. FEELING DOWN, DEPRESSED OR HOPELESS: NOT AT ALL
SUM OF ALL RESPONSES TO PHQ9 QUESTIONS 1 AND 2: 0

## 2024-01-09 NOTE — PROGRESS NOTES
"Subjective   Patient ID: Benjamín Sahu is a 54 y.o. male who presents for URI (Was seen at urgent care new years day and given medrol digna, that did not help).  Very pleasant 54-year-old with flulike symptoms  Have persisted for almost a week  Not getting any better  Went to urgent care was treated with a Medrol Dosepak  No improvement  Wheezing cough congestion  Has history of reactive airway  No fever but has had some chills  No shortness of breath or difficulty breathing or dyspnea        Review of Systems  Constitutional: no chills, no fever and no night sweats.   Eyes: no blurred vision and no eyesight problems.   ENT: no hearing loss, no nasal congestion, no nasal discharge, no hoarseness and no sore throat.   Cardiovascular: no chest pain, no intermittent leg claudication, no lower extremity edema, no palpitations and no syncope.   Respiratory: no cough, no shortness of breath during exertion, no shortness of breath at rest and no wheezing.   Gastrointestinal: no abdominal pain, no blood in stools, no constipation, no diarrhea, no melena, no nausea, no rectal pain and no vomiting.   Genitourinary: no dysuria, no change in urinary frequency, no urinary hesitancy, no feelings of urinary urgency and no vaginal discharge.   Musculoskeletal: no arthralgias,  no back pain and no myalgias.   Integumentary: no new skin lesions and no rashes.   Neurological: no difficulty walking, no headache, no limb weakness, no numbness and no tingling.   Psychiatric: no anxiety, no depression, no anhedonia and no substance use disorders.   Endocrine: no recent weight gain and no recent weight loss.   Hematologic/Lymphatic: no tendency for easy bruising and no swollen glands .    Objective    /73   Pulse 76   Temp 36.8 °C (98.2 °F)   Ht 1.753 m (5' 9\")   Wt 123 kg (272 lb)   BMI 40.17 kg/m²    Physical Exam  The patient appeared well nourished and normally developed. Vital signs as documented. Head exam is unremarkable. No " scleral icterus or corneal arcus noted.  Pupils are equal round reactive to light extraocular movements are intact no hemorrhages noted on funduscopic exam mouth mucous membranes are moist no exudates ears canals clear TMs are gray pearly not injected nose no rhinorrhea or epistaxis Neck is without jugular venous distension, thyromegaly, or carotid bruits. Carotid upstrokes are brisk bilaterally. Lungs are clear to auscultation and percussion. Cardiac exam reveals the PMI to be normally sized and situated. Rhythm is regular. First and second heart sounds normal. No murmurs, rubs or gallops. Abdominal exam reveals normal bowel sounds, no masses, no organomegaly and no aortic enlargement. Extremities are nonedematous and both femoral and pedal pulses are normal.  Neurologic exam DTRs are equal bilaterally no focal deficits strength is symmetrical heme lymph no palpable lymph nodes in the neck axilla or groin  Ill-appearing no acute distress nose congestion lungs Harsh cough no areas of consolidation  No accessory muscle use no retractions  Assessment/Plan   Problem List Items Addressed This Visit       Flu-like symptoms - Primary     Flulike symptoms rule out COVID and flu  Begin cefdinir  Checks chest x-ray for pneumonia if no improvement  Bromfed for cough  Close observation and follow-up if symptoms do not improve         Relevant Orders    POCT Influenza A/B manually resulted (Completed)    Sars-CoV-2 PCR, Symptomatic (Completed)    XR chest 2 views     Other Visit Diagnoses       Annual physical exam        Relevant Orders    Comprehensive Metabolic Panel (Completed)    Lipid Panel (Completed)    Hemoglobin A1C (Completed)    Hepatitis C antibody (Completed)                 Janet Hooker MD

## 2024-01-10 ENCOUNTER — TELEPHONE (OUTPATIENT)
Dept: PRIMARY CARE | Facility: CLINIC | Age: 54
End: 2024-01-10
Payer: COMMERCIAL

## 2024-01-10 LAB — SARS-COV-2 RNA RESP QL NAA+PROBE: NOT DETECTED

## 2024-01-10 NOTE — TELEPHONE ENCOUNTER
Patient has a covid test 1/9/24 in the office with Dr. Hooker.  He wants to know if it is positive and if he can get medication. [id number] - [facility] [id number] [address]   Phone: [phone]  Fax: [phone]

## 2024-01-10 NOTE — RESULT ENCOUNTER NOTE
Please call patient  Blood work looks good cholesterol levels are good hemoglobin A1c is better  Negative for hepatitis  Liver and kidney function are good  Can repeat in 1 year

## 2024-01-10 NOTE — TELEPHONE ENCOUNTER
----- Message from Janet Hooker MD sent at 1/10/2024 12:34 AM EST -----  Please call patient  Blood work looks good cholesterol levels are good hemoglobin A1c is better  Negative for hepatitis  Liver and kidney function are good  Can repeat in 1 year

## 2024-01-10 NOTE — TELEPHONE ENCOUNTER
----- Message from Janet Hooker MD sent at 1/10/2024 12:33 AM EST -----  Please call patient  Chest x-ray is normal shows no infection

## 2024-01-11 ENCOUNTER — OFFICE VISIT (OUTPATIENT)
Dept: OTOLARYNGOLOGY | Facility: CLINIC | Age: 54
End: 2024-01-11
Payer: COMMERCIAL

## 2024-01-11 VITALS — WEIGHT: 271.7 LBS | TEMPERATURE: 97.9 F | HEIGHT: 69 IN | BODY MASS INDEX: 40.24 KG/M2

## 2024-01-11 DIAGNOSIS — H93.8X2 SENSATION OF FULLNESS IN LEFT EAR: ICD-10-CM

## 2024-01-11 DIAGNOSIS — H66.93 CHRONIC OTITIS MEDIA OF BOTH EARS: ICD-10-CM

## 2024-01-11 DIAGNOSIS — H71.92 CHOLESTEATOMA OF LEFT EAR: ICD-10-CM

## 2024-01-11 DIAGNOSIS — H69.93 ETD (EUSTACHIAN TUBE DYSFUNCTION), BILATERAL: ICD-10-CM

## 2024-01-11 DIAGNOSIS — H61.22 IMPACTED CERUMEN OF LEFT EAR: Primary | ICD-10-CM

## 2024-01-11 PROCEDURE — 99213 OFFICE O/P EST LOW 20 MIN: CPT | Performed by: NURSE PRACTITIONER

## 2024-01-11 PROCEDURE — 3008F BODY MASS INDEX DOCD: CPT | Performed by: NURSE PRACTITIONER

## 2024-01-11 PROCEDURE — 3044F HG A1C LEVEL LT 7.0%: CPT | Performed by: NURSE PRACTITIONER

## 2024-01-11 PROCEDURE — 3048F LDL-C <100 MG/DL: CPT | Performed by: NURSE PRACTITIONER

## 2024-01-11 PROCEDURE — 1036F TOBACCO NON-USER: CPT | Performed by: NURSE PRACTITIONER

## 2024-01-11 ASSESSMENT — PATIENT HEALTH QUESTIONNAIRE - PHQ9
SUM OF ALL RESPONSES TO PHQ9 QUESTIONS 1 AND 2: 0
2. FEELING DOWN, DEPRESSED OR HOPELESS: NOT AT ALL
1. LITTLE INTEREST OR PLEASURE IN DOING THINGS: NOT AT ALL

## 2024-01-11 NOTE — PROGRESS NOTES
Subjective   Patient ID: Benjamín Sahu is a 54 y.o. male who presents for Otitis Media (Left ear).  HPI  Patient presents for an urgent visit.  In review, he has a history of bilateral COM, left cholesteatoma, left CSF leak, bilateral eustachian tube dysfunction.  He presents today with complaints of acute onset of left aural fullness associated with URI symptoms.  Patient was evaluated by urgent care as well as his PCP.  He reports that the urgent care provider told him that the left drum was red and there appeared to be pus behind the eardrum.  He has been treated with 2 rounds of oral antibiotics.  Patient reports that he continues to use daily Flonase and used Afrin x 2 days only.  When trying to insufflate, the left ear no longer pops.  He denies any otalgia, otorrhea, change in his hearing.  Review of Systems  A comprehensive or 10 points review of the patient´s constitutional, neurological, HEENT, pulmonary, cardiovascular and genito-urinary systems showed only those mentioned in history of present illness.    Objective   Physical Exam  Constitutional: no fever, chills, weight loss or weight gain   General appearance: Appears well, well-nourished, well groomed. No acute distress.   Communication: Normal communication   Psychiatric: Oriented to person, place and time. Normal mood and affect.   Neurologic: Cranial nerves II-XII grossly intact and symmetric bilaterally.   Head and Face:   Head: Atraumatic with no masses, lesions or scarring.   Face: Normal symmetry, no paralysis, synkinesis or facial tic. No scars or deformities.     Eyes: Conjunctiva not edematous or erythematous   Ears: External inspection of ears with no deformity, scars or masses.  Right canal clear.  TM with PE tube in good position and patent.  No erythema or otorrhea noted.  Left canal essentially clear.  There is hard cerumen adherent to the posterior aspect of the cartilage graft.  Anterior portion slightly retracted with clear fluid  which is unchanged from previous exam.  Neck: Normal appearing, symmetric, trachea midline.   Cardiovascular: Examination of peripheral vascular system shows no clubbing or cyanosis.   Respiratory: No respiratory distress increased work of breathing. Inspection of the chest with symmetric chest expansion and normal respiratory effort.   Skin: No rashes in the head or neck    Assessment/Plan     This patient presents for subsequent evaluation of acute acquired left cerumen impaction and left aural fullness as well as bilateral chronic otitis media, bilateral eustachian tube dysfunction, and left cholesteatoma.     Reassurance given that otologic exam remains stable.  I do not see any concern for infection behind the left TM.  I recommended he continue using Flonase and use cycled Afrin until URI symptoms have completely resolved.  He has follow-up scheduled with Dr. Marin next month. He may follow-up with me as needed.  All questions were answered to patient's satisfaction.    This note was created using speech recognition transcription software. Despite proofreading, several typographical errors might be present that might affect the meaning of the content. Please call with any questions.  Patient ID: Benjamín Sahu is a 54 y.o. male.    Ear cerumen removal    Date/Time: 1/11/2024 3:38 PM    Performed by: SANDY Ray  Authorized by: SANDY Ray    Consent:     Consent obtained:  Verbal    Consent given by:  Patient    Risks discussed:  Pain    Alternatives discussed:  No treatment  Procedure details:     Location:  L ear    Procedure type comment:  Alligator and suction    Procedure outcomes: cerumen removed    Post-procedure details:     Inspection:  No bleeding and some cerumen remaining    Hearing quality:  Normal    Procedure completion:  Tolerated well, no immediate complications  Comments:      Moderate amount of dry hard yellow cerumen removed off the posterior aspect of the graft.   This did extend to the superior aspect which was left in place given history of CSF leak.  After cleaning, graft intact.  No erythema or otorrhea noted.    Mila Poon, NOLAN-CNP 01/11/24 3:33 PM

## 2024-01-12 NOTE — RESULT ENCOUNTER NOTE
Call patient and tell him that he tested negative for COVID and flu  Please tell him to let me know if he does not start feeling better

## 2024-01-15 ENCOUNTER — TELEPHONE (OUTPATIENT)
Dept: PRIMARY CARE | Facility: CLINIC | Age: 54
End: 2024-01-15
Payer: COMMERCIAL

## 2024-01-15 NOTE — TELEPHONE ENCOUNTER
----- Message from Janet Hooker MD sent at 1/11/2024  9:08 PM EST -----  Call patient and tell him that he tested negative for COVID and flu  Please tell him to let me know if he does not start feeling better

## 2024-01-16 ENCOUNTER — TELEPHONE (OUTPATIENT)
Dept: PRIMARY CARE | Facility: CLINIC | Age: 54
End: 2024-01-16
Payer: COMMERCIAL

## 2024-01-28 PROBLEM — R68.89 FLU-LIKE SYMPTOMS: Status: ACTIVE | Noted: 2024-01-28

## 2024-01-28 NOTE — PATIENT INSTRUCTIONS
Today you were evaluated for flulike symptoms  Please follow-up if your symptoms do not improve and please watch for signs of pneumonia as discussed  Please schedule annual wellness exam for health maintenance at your convenience

## 2024-01-28 NOTE — ASSESSMENT & PLAN NOTE
Flulike symptoms rule out COVID and flu  Begin cefdinir  Checks chest x-ray for pneumonia if no improvement  Bromfed for cough  Close observation and follow-up if symptoms do not improve

## 2024-01-29 ENCOUNTER — HOSPITAL ENCOUNTER (OUTPATIENT)
Dept: RADIOLOGY | Facility: CLINIC | Age: 54
Discharge: HOME | End: 2024-01-29
Payer: COMMERCIAL

## 2024-01-29 DIAGNOSIS — S49.92XA UNSPECIFIED INJURY OF LEFT SHOULDER AND UPPER ARM, INITIAL ENCOUNTER: ICD-10-CM

## 2024-01-29 PROCEDURE — 73030 X-RAY EXAM OF SHOULDER: CPT | Mod: LEFT SIDE | Performed by: RADIOLOGY

## 2024-01-29 PROCEDURE — 73030 X-RAY EXAM OF SHOULDER: CPT | Mod: LT

## 2024-01-30 ENCOUNTER — ALLIED HEALTH (OUTPATIENT)
Dept: INTEGRATIVE MEDICINE | Facility: CLINIC | Age: 54
End: 2024-01-30
Payer: COMMERCIAL

## 2024-01-30 DIAGNOSIS — M99.02 SEGMENTAL AND SOMATIC DYSFUNCTION OF THORACIC REGION: ICD-10-CM

## 2024-01-30 DIAGNOSIS — M47.812 CERVICAL SPONDYLOSIS WITHOUT MYELOPATHY: ICD-10-CM

## 2024-01-30 DIAGNOSIS — G25.89 SCAPULAR DYSKINESIS: ICD-10-CM

## 2024-01-30 DIAGNOSIS — M99.01 SEGMENTAL DYSFUNCTION OF CERVICAL REGION: Primary | ICD-10-CM

## 2024-01-30 DIAGNOSIS — M99.00 SEGMENTAL DYSFUNCTION OF HEAD REGION: ICD-10-CM

## 2024-01-30 PROCEDURE — 98941 CHIROPRACT MANJ 3-4 REGIONS: CPT | Performed by: CHIROPRACTOR

## 2024-01-30 NOTE — PROGRESS NOTES
Subjective   Patient ID: Benjamín Sahu is a 54 y.o. male who presents January 30, 2024 for chiropractic care.    (1/15) [facility]    Today, the patient rates their degree of pain as a 5 out of 10 on the numeric pain rating scale.     Benjamín returns for continued chiropractic care. He was last seen by Dr. Manzo in October 2023. He presents with increased discomfort in the neck, shoulders, and upper back. He states that he has had chronic discomfort. However, his symptoms occur intermittently and this is the most recent episode. The patient denies any changes in health since his last encounter and will follow up as scheduled.      Objective   Physical Exam  Neurological:      General: No focal deficit present.      Mental Status: He is alert and oriented to person, place, and time.      Cranial Nerves: No dysarthria or facial asymmetry.      Sensory: Sensation is intact.      Motor: Motor function is intact.      Coordination: Coordination is intact.      Gait: Gait is intact.       Palpation of the following region(s) revealed:  Cervical: Scalenes bilateral, muscular hypertonicity.  Upper trapezius bilateral, muscular hypertonicity.  Levator scapulae bilateral, muscular hypertonicity.  Cervical paraspinals bilateral, muscular hypertonicity.  Thoracic: Thoracic paraspinals bilateral, muscular hypertonicity.  Middle trapezius bilateral, muscular hypertonicity.  Rhomboids bilateral, muscular hypertonicity.          Segmental Joint(s): Segmental joint dysfunction was assessed with motion palpation and is identified in the following areas:  Cervical : C2 C4 C5  Thoracic : T2., T4, and T5    Assessment/Plan   Today's Treatment Included: Chiropractic manipulation to the Segmental Joint(s) Cervical : C2 C5  Segmental Joint(s) Thoracic : T2., T4, and T5   Treatment Techniques Used : Diversified CMT, Activator/Tool assisted technique, and Manual traction  Pin and stretch  Integrative Dry Needling (IDN) - Needles in / out:   6.    Soft-tissue mobilization was performed in the following areas:   Cervical paraspinal mm. bilateral, Scalenes bilateral, Upper Trapezius bilateral, and Levator Scap. bilateral  Thoracic Paraspinal mm. bilateral, Middle Trapezius bilateral, and Rhomboids bilateral              The patient tolerated today's treatment with little or no additional discomfort and was instructed to contact the office for questions or concerns.

## 2024-02-05 ENCOUNTER — OFFICE VISIT (OUTPATIENT)
Dept: OTOLARYNGOLOGY | Facility: CLINIC | Age: 54
End: 2024-02-05
Payer: COMMERCIAL

## 2024-02-05 VITALS — HEIGHT: 69 IN | WEIGHT: 273.4 LBS | BODY MASS INDEX: 40.49 KG/M2

## 2024-02-05 DIAGNOSIS — H66.93 CHRONIC OTITIS MEDIA OF BOTH EARS: ICD-10-CM

## 2024-02-05 DIAGNOSIS — J01.00 ACUTE NON-RECURRENT MAXILLARY SINUSITIS: ICD-10-CM

## 2024-02-05 DIAGNOSIS — H69.93 ETD (EUSTACHIAN TUBE DYSFUNCTION), BILATERAL: Primary | ICD-10-CM

## 2024-02-05 PROCEDURE — 3048F LDL-C <100 MG/DL: CPT | Performed by: OTOLARYNGOLOGY

## 2024-02-05 PROCEDURE — 1036F TOBACCO NON-USER: CPT | Performed by: OTOLARYNGOLOGY

## 2024-02-05 PROCEDURE — 3044F HG A1C LEVEL LT 7.0%: CPT | Performed by: OTOLARYNGOLOGY

## 2024-02-05 PROCEDURE — 3008F BODY MASS INDEX DOCD: CPT | Performed by: OTOLARYNGOLOGY

## 2024-02-05 PROCEDURE — 99213 OFFICE O/P EST LOW 20 MIN: CPT | Performed by: OTOLARYNGOLOGY

## 2024-02-05 RX ORDER — FLUTICASONE PROPIONATE 50 MCG
1 SPRAY, SUSPENSION (ML) NASAL 2 TIMES DAILY
Qty: 48 G | Refills: 11 | Status: SHIPPED | OUTPATIENT
Start: 2024-02-05 | End: 2024-03-11

## 2024-02-05 ASSESSMENT — PAIN SCALES - GENERAL: PAINLEVEL: 6

## 2024-02-05 NOTE — PROGRESS NOTES
History of present illness:  Benjamín Sahu is a 54 y.o. male presenting today for follow-up.  Overall he is doing well.  He continues to complain of intermittent or occasional popping sensation in his ears.  He denies any drainage.  Hearing seems to be stable.         The patient's current medications, active allergies and list of medical problems were reviewed in the EHR and confirmed electronically there are as follows;  Allergies:   No Known Allergies  Current list of medications:   Current Outpatient Medications   Medication Sig Dispense Refill    aspirin 81 mg EC tablet Take 1 tablet (81 mg) by mouth once daily.      atorvastatin (Lipitor) 80 mg tablet Take 1 tablet (80 mg) by mouth once daily.      benzoyl peroxide 5 % lotion Apply 1 Application topically.      blood sugar diagnostic (Blood Glucose Test) strip Check fasting glucose at least once a week, and 2 hours after a meal at least once daily. Dx: DM-2 (E11.9).      brexpiprazole (Rexulti) 1 mg tablet Take 1 tablet (1 mg) by mouth.      cefdinir (Omnicef) 300 mg capsule Take 1 capsule (300 mg) by mouth.      cetirizine (ZyrTEC) 10 mg tablet Take 1 tablet (10 mg) by mouth once daily. 30 tablet 5    clindamycin (Cleocin T) 1 % lotion Apply topically.      cyclobenzaprine (Flexeril) 10 mg tablet Take 1 tablet (10 mg) by mouth 3 times a day as needed.      escitalopram (Lexapro) 10 mg tablet Take 1 tablet (10 mg) by mouth once daily.      escitalopram (Lexapro) 20 mg tablet Take 1 tablet (20 mg) by mouth once daily.      fluticasone (Flonase) 50 mcg/actuation nasal spray instill 1 spray into each nostril twice a day 48 g 11    ibuprofen 600 mg tablet Take 1 tablet (600 mg) by mouth if needed.      ibuprofen 800 mg tablet Take 1 tablet (800 mg) by mouth every 8 hours if needed.      isosorbide mononitrate ER (Imdur) 30 mg 24 hr tablet Take 1 tablet (30 mg) by mouth once daily.      ketoconazole (NIZOral) 2 % cream Apply topically 2 times a day.      melatonin 5  mg tablet Take 1 tablet (5 mg) by mouth once daily at bedtime.      meloxicam (Mobic) 7.5 mg tablet Take 1 tablet (7.5 mg) by mouth once daily.      metFORMIN  mg 24 hr tablet Take 4 tablets (2,000 mg) by mouth once daily in the evening. Take with meals.      metoprolol succinate XL (Toprol-XL) 25 mg 24 hr tablet Take 1 tablet (25 mg) by mouth once daily.      mupirocin (Bactroban) 2 % ointment Apply topically 3 times a day.      naproxen (Naprosyn) 500 mg tablet Take 1 tablet (500 mg) by mouth.      nitroglycerin (Nitrostat) 0.4 mg SL tablet 1 tablet (0.4 mg).      pantoprazole (ProtoNix) 20 mg EC tablet Take 1 tablet (20 mg) by mouth.      pantoprazole (ProtoNix) 40 mg EC tablet Take 1 tablet (40 mg) by mouth 2 times a day.      Pataday Once Daily Relief 0.2 % ophthalmic solution       rosuvastatin (Crestor) 40 mg tablet Take 1 tablet (40 mg) by mouth once daily.      sucralfate (Carafate) 1 gram tablet Take 1 tablet (1 g) by mouth 4 times a day.      tiZANidine (Zanaflex) 2 mg tablet Take 1 tablet (2 mg) by mouth every 8 hours.      tretinoin (Retin-A) 0.05 % cream Apply topically once daily at bedtime.      triamcinolone (Nasacort) 55 mcg nasal inhaler Administer 2 sprays into each nostril once daily. 16.5 g 5     No current facility-administered medications for this visit.     Problem list:  Patient Active Problem List   Diagnosis    Abnormal stress test    Acne vulgaris    Acquired absence of other organs    Alcohol abuse, in remission    Allergic rhinitis    Chronic mastoiditis    Chronic rhinitis    Chronic sinusitis    Angina pectoris (CMS/HCC)    Scapular dyskinesis    Antalgic gait    Arthralgia of right temporomandibular joint    Temporomandibular joint click    TMJ pain dysfunction syndrome    Atherosclerosis of coronary artery    Carpal tunnel syndrome, bilateral    Cervical spondylosis without myelopathy    Cholesteatoma, left    Chondromalacia of left patella    Chronic otitis media of both  ears    Chronic ankle pain    Chronic pain    Colon polyp    Conductive hearing loss, bilateral    Coronary artery calcification seen on CT scan    CSF leak    Decreased range of motion of left ankle    Depression    Diabetes mellitus type 2 in obese (CMS/HCC)    Disorder of vocal cords    Dyslipidemia    ETD (Eustachian tube dysfunction), bilateral    Gastrointestinal hemorrhage    Generalized anxiety disorder    GERD (gastroesophageal reflux disease)    Granuloma of skin    Other bursal cyst, unspecified hand    History of stroke with residual effects    Immune deficiency disorder (CMS/HCC)    Impaired strength of lower extremity    Impetigo    Incipient senile cataract    Ankle instability    Ischemic optic neuropathy, right    Optic atrophy of right eye    Melanocytic nevi of trunk    Melanocytic nevi of unspecified upper limb, including shoulder    Melanocytic nevi of other parts of face    Migraine with visual aura    Mixed hearing loss of left ear    Morbid obesity (CMS/HCC)    Myopia    Myringotomy tube(s) status    Neoplasm of uncertain behavior of skin    Neuritis    Obesity (BMI 30-39.9)    MARIFER (obstructive sleep apnea)    Otalgia, right ear    Other seborrheic keratosis    Personal history of colonic polyps    Pes cavus    PND (paroxysmal nocturnal dyspnea)    Pyogenic granuloma    Segmental and somatic dysfunction    Skin changes due to chronic exposure to nonionizing radiation, unspecified    Subjective tinnitus of left ear    Tonsil asymmetry    Trigeminal neuropathy    VMR (vasomotor rhinitis)    Flu-like symptoms         Physical Examination:  CONSTITUTIONAL:  No acute distress  VOICE:  No hoarseness or other abnormality  RESPIRATION:  Breathing comfortably, no stridor  CV:  No clubbing/cyanosis/edema in hands  EYES:  EOM intact, sclera clear  NEURO:  Alert and oriented times 3, Cranial nerves II-XII grossly intact and symmetric bilaterally  HEAD AND FACE:  Symmetric facial features, no masses or  lesions  RIGHT EAR:  Normal external ear and post auricular area, no visible lesions, external auditory canal patent, tympanic membrane with patent tympanostomy tube.  Minimal debris around the base of the tube.  No infection no drainage.  LEFT EAR: Cavity examined.  Graft is intact.  Middle ear space is actually aerated today.  Minimal crusting along the tegmen left undisturbed.    NOSE:  Anterior rhinoscopy clear, no significant external deformity.  ORAL CAVITY/OROPHARYNX/LIPS:  normal lining, mobile tongue.  PHARYNGEAL WALLS: Moist mucosal lining, good palatal elevation  NECK/LYMPH:  No LAD, no thyroid masses, trachea midline  SKIN:  Neck and facial skin is without scar or injury  PSYCH:  Alert and oriented with appropriate mood and affect          Impression:  Bilateral chronic otitis media  Left mixed hearing loss  Bilateral Chronic ETD    Recommendation:  No active issues.  Follow-up as needed.  He would like a refill of his Flonase sent to his new pharmacy [facility]  Scribe Attestation  By signing my name below, Ladi UMANZOR, [employer]   attest that this documentation has been prepared under the direction and in the presence of Marquez aMrin MD.

## 2024-02-08 ENCOUNTER — OFFICE VISIT (OUTPATIENT)
Dept: ORTHOPEDIC SURGERY | Facility: HOSPITAL | Age: 54
End: 2024-02-08
Payer: COMMERCIAL

## 2024-02-08 VITALS — BODY MASS INDEX: 39.69 KG/M2 | HEIGHT: 69 IN | WEIGHT: 268 LBS

## 2024-02-08 DIAGNOSIS — S46.012A STRAIN OF ROTATOR CUFF OF LEFT SHOULDER: ICD-10-CM

## 2024-02-08 DIAGNOSIS — S49.92XA UNSPECIFIED INJURY OF LEFT SHOULDER AND UPPER ARM, INITIAL ENCOUNTER: ICD-10-CM

## 2024-02-08 PROCEDURE — 3048F LDL-C <100 MG/DL: CPT | Performed by: STUDENT IN AN ORGANIZED HEALTH CARE EDUCATION/TRAINING PROGRAM

## 2024-02-08 PROCEDURE — 3008F BODY MASS INDEX DOCD: CPT | Performed by: STUDENT IN AN ORGANIZED HEALTH CARE EDUCATION/TRAINING PROGRAM

## 2024-02-08 PROCEDURE — 20610 DRAIN/INJ JOINT/BURSA W/O US: CPT | Performed by: STUDENT IN AN ORGANIZED HEALTH CARE EDUCATION/TRAINING PROGRAM

## 2024-02-08 PROCEDURE — 2500000004 HC RX 250 GENERAL PHARMACY W/ HCPCS (ALT 636 FOR OP/ED): Performed by: STUDENT IN AN ORGANIZED HEALTH CARE EDUCATION/TRAINING PROGRAM

## 2024-02-08 PROCEDURE — 1036F TOBACCO NON-USER: CPT | Performed by: STUDENT IN AN ORGANIZED HEALTH CARE EDUCATION/TRAINING PROGRAM

## 2024-02-08 PROCEDURE — 99214 OFFICE O/P EST MOD 30 MIN: CPT | Performed by: STUDENT IN AN ORGANIZED HEALTH CARE EDUCATION/TRAINING PROGRAM

## 2024-02-08 PROCEDURE — 2500000005 HC RX 250 GENERAL PHARMACY W/O HCPCS: Performed by: STUDENT IN AN ORGANIZED HEALTH CARE EDUCATION/TRAINING PROGRAM

## 2024-02-08 PROCEDURE — 3044F HG A1C LEVEL LT 7.0%: CPT | Performed by: STUDENT IN AN ORGANIZED HEALTH CARE EDUCATION/TRAINING PROGRAM

## 2024-02-08 RX ADMIN — LIDOCAINE HYDROCHLORIDE 2 ML: 10 INJECTION, SOLUTION INFILTRATION; PERINEURAL at 13:50

## 2024-02-08 RX ADMIN — TRIAMCINOLONE ACETONIDE 40 MG: 40 INJECTION, SUSPENSION INTRA-ARTICULAR; INTRAMUSCULAR at 13:50

## 2024-02-08 ASSESSMENT — PAIN SCALES - GENERAL: PAINLEVEL_OUTOF10: 7

## 2024-02-08 ASSESSMENT — PAIN DESCRIPTION - DESCRIPTORS: DESCRIPTORS: ACHING;THROBBING;TINGLING

## 2024-02-08 ASSESSMENT — PAIN - FUNCTIONAL ASSESSMENT: PAIN_FUNCTIONAL_ASSESSMENT: 0-10
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
## 2024-02-10 RX ORDER — TRIAMCINOLONE ACETONIDE 40 MG/ML
40 INJECTION, SUSPENSION INTRA-ARTICULAR; INTRAMUSCULAR
Status: COMPLETED | OUTPATIENT
Start: 2024-02-08 | End: 2024-02-08

## 2024-02-10 RX ORDER — LIDOCAINE HYDROCHLORIDE 10 MG/ML
2 INJECTION INFILTRATION; PERINEURAL
Status: COMPLETED | OUTPATIENT
Start: 2024-02-08 | End: 2024-02-08

## 2024-02-14 ENCOUNTER — ALLIED HEALTH (OUTPATIENT)
Dept: INTEGRATIVE MEDICINE | Facility: CLINIC | Age: 54
End: 2024-02-14
Payer: COMMERCIAL

## 2024-02-14 DIAGNOSIS — M99.02 SEGMENTAL AND SOMATIC DYSFUNCTION OF THORACIC REGION: ICD-10-CM

## 2024-02-14 DIAGNOSIS — M99.01 SEGMENTAL DYSFUNCTION OF CERVICAL REGION: Primary | ICD-10-CM

## 2024-02-14 DIAGNOSIS — M99.00 SEGMENTAL DYSFUNCTION OF HEAD REGION: ICD-10-CM

## 2024-02-14 DIAGNOSIS — G25.89 SCAPULAR DYSKINESIS: ICD-10-CM

## 2024-02-14 DIAGNOSIS — M47.812 CERVICAL SPONDYLOSIS WITHOUT MYELOPATHY: ICD-10-CM

## 2024-02-14 PROCEDURE — 98941 CHIROPRACT MANJ 3-4 REGIONS: CPT | Performed by: CHIROPRACTOR

## 2024-02-14 NOTE — PROGRESS NOTES
Subjective   Patient ID: Benjamín Sahu is a 54 y.o. male who presents February 14, 2024 for chiropractic care.    (2/15) VPCY    Today, the patient rates their degree of pain as a 5 out of 10 on the numeric pain rating scale.     Benjamín returns for continued chiropractic care. He states that he has been experiencing continued pain along the upper shoulders and mid back. He states that he responded well to his last treatment. The patient denies any changes in health since his last encounter and will follow up as scheduled.      Objective   Physical Exam  Neurological:      General: No focal deficit present.      Mental Status: He is alert and oriented to person, place, and time.      Cranial Nerves: No dysarthria or facial asymmetry.      Sensory: Sensation is intact.      Motor: Motor function is intact.      Coordination: Coordination is intact.      Gait: Gait is intact.       Palpation of the following region(s) revealed:  Cervical: Scalenes bilateral, muscular hypertonicity.  Upper trapezius bilateral, muscular hypertonicity.  Levator scapulae bilateral, muscular hypertonicity.  Cervical paraspinals bilateral, muscular hypertonicity.  Thoracic: Thoracic paraspinals bilateral, muscular hypertonicity.  Middle trapezius bilateral, muscular hypertonicity.  Rhomboids bilateral, muscular hypertonicity.    Segmental Joint(s): Segmental joint dysfunction was assessed with motion palpation and is identified in the following areas:  Cervical : C2 C4 C5  Thoracic : T2., T4, and T5    Assessment/Plan   Today's Treatment Included: Chiropractic manipulation to the Segmental Joint(s) Cervical : C5 C6  Segmental Joint(s) Thoracic : T3, T4, and T5   Treatment Techniques Used : Diversified CMT and Manual traction  Pin and stretch  Integrative Dry Needling (IDN) - Needles in / out:  10.    Soft-tissue mobilization was performed in the following areas:   Cervical paraspinal mm. bilateral, Scalenes bilateral, Upper Trapezius bilateral,  and Levator Scap. bilateral  Thoracic Paraspinal mm. bilateral, Middle Trapezius bilateral, and Rhomboids bilateral    The patient tolerated today's treatment with little or no additional discomfort and was instructed to contact the office for questions or concerns.

## 2024-02-22 PROBLEM — R06.09 DYSPNEA ON EXERTION: Status: ACTIVE | Noted: 2024-02-22

## 2024-02-22 RX ORDER — AMOXICILLIN AND CLAVULANATE POTASSIUM 875; 125 MG/1; MG/1
TABLET, FILM COATED ORAL
COMMUNITY
Start: 2024-02-12 | End: 2024-03-11 | Stop reason: ALTCHOICE

## 2024-02-22 RX ORDER — CIPROFLOXACIN AND DEXAMETHASONE 3; 1 MG/ML; MG/ML
SUSPENSION/ DROPS AURICULAR (OTIC)
COMMUNITY
Start: 2024-02-05 | End: 2024-03-11 | Stop reason: ALTCHOICE

## 2024-02-23 ENCOUNTER — APPOINTMENT (OUTPATIENT)
Dept: CARDIOLOGY | Facility: CLINIC | Age: 54
End: 2024-02-23
Payer: COMMERCIAL

## 2024-02-26 ENCOUNTER — APPOINTMENT (OUTPATIENT)
Dept: CARDIOLOGY | Facility: CLINIC | Age: 54
End: 2024-02-26
Payer: COMMERCIAL

## 2024-03-06 ENCOUNTER — EVALUATION (OUTPATIENT)
Dept: PHYSICAL THERAPY | Facility: CLINIC | Age: 54
End: 2024-03-06
Payer: COMMERCIAL

## 2024-03-06 DIAGNOSIS — S46.012A STRAIN OF ROTATOR CUFF OF LEFT SHOULDER: ICD-10-CM

## 2024-03-06 PROCEDURE — 97161 PT EVAL LOW COMPLEX 20 MIN: CPT | Mod: GP | Performed by: PHYSICAL THERAPIST

## 2024-03-06 PROCEDURE — 97110 THERAPEUTIC EXERCISES: CPT | Mod: GP | Performed by: PHYSICAL THERAPIST

## 2024-03-06 ASSESSMENT — ENCOUNTER SYMPTOMS
DEPRESSION: 1
LOSS OF SENSATION IN FEET: 0
OCCASIONAL FEELINGS OF UNSTEADINESS: 1

## 2024-03-06 NOTE — PROGRESS NOTES
Physical Therapy    Physical Therapy Evaluation and Treatment      Patient Name: Benjamín Sahu  MRN: [id number]  Today's Date: 3/6/2024  Visit: 1  Insurance: Reviewed  Physician: Alex Gonzalez  PT Evaluation Time Entry  PT Evaluation (Low) Time Entry: 25  PT Therapeutic Procedures Time Entry  Therapeutic Exercise Time Entry: 10  Time Calculation  Start Time: 0455  Stop Time: 0530  Time Calculation (min): 35 min    Assessment: Patient seen in PT for Initial Evaluation for shoulder pain secondary to rtc strain.   Patient presents with postural deviation  decreased shoulder ROM , decreased shoulder strength, shoulder  tenderness.    Functionally, patient  unable to lift heavy objects.  Patient rates quickdash at 18.2%.    PT Assessment  Rehab Prognosis: Good     Plan:  Continue with POC  Pulleys/ube, shoulder ROM with wand, rtc strength with theraband, progress to PRE's with weights    OP PT Plan  PT Plan: Skilled PT  PT Frequency: 1 time per week  Duration: 8  Onset Date: 02/08/24  Rehab Potential: Good  Plan of Care Agreement: Patient    Current Problem:   1. Strain of rotator cuff of left shoulder  Referral to Physical Therapy    Follow Up In Physical Therapy          Subjective    General: Patient with a history of shoulder pain  since January.  Onset was after fall slipping on ice - fell on left side.  Patient treated with cortisone shot.  Patient complains of pain in the region of the post left shoulder to deltoid, ache pain, 4/10 at worst last 7 days.  Patient's pain is worse with shoulder abd and shoulder horizontal add.  Functionally, patient is unable to lift anything heavy .  Xray taken.   Left handed - works as a .         Precautions: fall risk     Prior Level of Function: difficulty lifting since onset of shoulder 4 years ago       Objective     Postural deviation: rounded shoulders, thoracic kyphosis, prominent left ac joint  left Shoulder ROM to 150 flexion, 120 abduction, 70 er, and HBB to  T7  left Shoulder strength 4/5  flex, 4/5 abduction, 4/5  ER, and 5/5  IR  Special tests: negative impingement  Patient tender to palpation at the ant shoulder    Outcome Measures:  Other Measures  Disability of Arm Shoulder Hand (DASH): 18.2%     Treatments:  Patient instructed in HEP including: Codman's 20 each, wand assisted scaption and extension 10 each, and resisted shoulder ext, add, abd, er and ir with red theraband 20X each (10 minutes)        EDUCATION: HEP       Goals:  Active       PT Problem       PT Goal 1       Start:  03/06/24    Expected End:  06/06/24       Shoulder pain 2/10 or less         PT Goal 2       Start:  03/06/24    Expected End:  06/06/24       Improve quickdash by 10%         PT Goal 3       Start:  03/06/24    Expected End:  06/06/24       Shoulder elevation ROM to 160         PT Goal 4       Start:  03/06/24    Expected End:  06/06/24       5/5 shoulder strength

## 2024-03-08 RX ORDER — PENICILLIN V POTASSIUM 500 MG/1
TABLET, FILM COATED ORAL
COMMUNITY
Start: 2023-12-08 | End: 2024-03-11 | Stop reason: ALTCHOICE

## 2024-03-11 ENCOUNTER — OFFICE VISIT (OUTPATIENT)
Dept: CARDIOLOGY | Facility: CLINIC | Age: 54
End: 2024-03-11
Payer: COMMERCIAL

## 2024-03-11 VITALS
HEIGHT: 69 IN | WEIGHT: 273 LBS | BODY MASS INDEX: 40.43 KG/M2 | HEART RATE: 79 BPM | OXYGEN SATURATION: 93 % | SYSTOLIC BLOOD PRESSURE: 132 MMHG | DIASTOLIC BLOOD PRESSURE: 81 MMHG

## 2024-03-11 DIAGNOSIS — I25.10 CORONARY ARTERY DISEASE INVOLVING NATIVE CORONARY ARTERY OF NATIVE HEART WITHOUT ANGINA PECTORIS: ICD-10-CM

## 2024-03-11 DIAGNOSIS — I25.10 CORONARY ARTERY CALCIFICATION SEEN ON CT SCAN: Primary | ICD-10-CM

## 2024-03-11 PROCEDURE — 3075F SYST BP GE 130 - 139MM HG: CPT | Performed by: INTERNAL MEDICINE

## 2024-03-11 PROCEDURE — 3048F LDL-C <100 MG/DL: CPT | Performed by: INTERNAL MEDICINE

## 2024-03-11 PROCEDURE — 93005 ELECTROCARDIOGRAM TRACING: CPT | Performed by: INTERNAL MEDICINE

## 2024-03-11 PROCEDURE — 99214 OFFICE O/P EST MOD 30 MIN: CPT | Performed by: INTERNAL MEDICINE

## 2024-03-11 PROCEDURE — 3044F HG A1C LEVEL LT 7.0%: CPT | Performed by: INTERNAL MEDICINE

## 2024-03-11 PROCEDURE — 93010 ELECTROCARDIOGRAM REPORT: CPT | Performed by: INTERNAL MEDICINE

## 2024-03-11 PROCEDURE — 1036F TOBACCO NON-USER: CPT | Performed by: INTERNAL MEDICINE

## 2024-03-11 PROCEDURE — 3079F DIAST BP 80-89 MM HG: CPT | Performed by: INTERNAL MEDICINE

## 2024-03-11 RX ORDER — ROSUVASTATIN CALCIUM 40 MG/1
40 TABLET, COATED ORAL DAILY
Qty: 90 TABLET | Refills: 3 | Status: SHIPPED | OUTPATIENT
Start: 2024-03-11 | End: 2025-03-11

## 2024-03-11 RX ORDER — ASPIRIN 81 MG/1
81 TABLET ORAL DAILY
Qty: 90 TABLET | Refills: 3 | Status: SHIPPED | OUTPATIENT
Start: 2024-03-11 | End: 2025-03-11

## 2024-03-11 ASSESSMENT — COLUMBIA-SUICIDE SEVERITY RATING SCALE - C-SSRS
6. HAVE YOU EVER DONE ANYTHING, STARTED TO DO ANYTHING, OR PREPARED TO DO ANYTHING TO END YOUR LIFE?: NO
1. IN THE PAST MONTH, HAVE YOU WISHED YOU WERE DEAD OR WISHED YOU COULD GO TO SLEEP AND NOT WAKE UP?: NO
2. HAVE YOU ACTUALLY HAD ANY THOUGHTS OF KILLING YOURSELF?: NO

## 2024-03-11 ASSESSMENT — PATIENT HEALTH QUESTIONNAIRE - PHQ9
4. FEELING TIRED OR HAVING LITTLE ENERGY: NEARLY EVERY DAY
1. LITTLE INTEREST OR PLEASURE IN DOING THINGS: NEARLY EVERY DAY
6. FEELING BAD ABOUT YOURSELF - OR THAT YOU ARE A FAILURE OR HAVE LET YOURSELF OR YOUR FAMILY DOWN: NEARLY EVERY DAY
5. POOR APPETITE OR OVEREATING: NEARLY EVERY DAY
3. TROUBLE FALLING OR STAYING ASLEEP OR SLEEPING TOO MUCH: NEARLY EVERY DAY
9. THOUGHTS THAT YOU WOULD BE BETTER OFF DEAD, OR OF HURTING YOURSELF: NOT AT ALL
SUM OF ALL RESPONSES TO PHQ QUESTIONS 1-9: 21
8. MOVING OR SPEAKING SO SLOWLY THAT OTHER PEOPLE COULD HAVE NOTICED. OR THE OPPOSITE, BEING SO FIGETY OR RESTLESS THAT YOU HAVE BEEN MOVING AROUND A LOT MORE THAN USUAL: NOT AT ALL
10. IF YOU CHECKED OFF ANY PROBLEMS, HOW DIFFICULT HAVE THESE PROBLEMS MADE IT FOR YOU TO DO YOUR WORK, TAKE CARE OF THINGS AT HOME, OR GET ALONG WITH OTHER PEOPLE: VERY DIFFICULT
7. TROUBLE CONCENTRATING ON THINGS, SUCH AS READING THE NEWSPAPER OR WATCHING TELEVISION: NEARLY EVERY DAY
2. FEELING DOWN, DEPRESSED OR HOPELESS: NEARLY EVERY DAY
SUM OF ALL RESPONSES TO PHQ9 QUESTIONS 1 AND 2: 6

## 2024-03-11 ASSESSMENT — ENCOUNTER SYMPTOMS
OCCASIONAL FEELINGS OF UNSTEADINESS: 0
CHILLS: 0
DYSURIA: 0
MEMORY LOSS: 0
DIARRHEA: 0
CONSTIPATION: 0
HEADACHES: 0
NAUSEA: 0
COUGH: 0
FALLS: 0
VOMITING: 0
HEMATURIA: 0
FEVER: 0
DEPRESSION: 0
WHEEZING: 0
BLOATING: 0
ALTERED MENTAL STATUS: 0
ABDOMINAL PAIN: 0
LOSS OF SENSATION IN FEET: 0
MYALGIAS: 0
HEMOPTYSIS: 0

## 2024-03-11 ASSESSMENT — PAIN SCALES - GENERAL: PAINLEVEL: 0-NO PAIN

## 2024-03-11 NOTE — PROGRESS NOTES
Chief complaint:  FU     HPI  53 yo WM w/ h/o CAD, bord HPL, GERD, mild MARIFER (intol CPAP), OM, TOB (cigars, 20 yr, quit '09) now here for cardiology f/u. No further CP (prior occ vague L chest pain -> LUE at rest, ?assoc mild dyspnea, no assoc N/V/diaph). No dyspnea at rest. +occ CARMONA (mod exertion). No orthopnea/PND. +rare palps x few minutes. No LH/dizziness/syncope. No edema. No claudication. No cough. +occ fatigue. +snoring.  He occ walks with no symptoms.  ECG 9/16: SB (54)  ECG 11/16: SB (56)  ECG 4/20: SB (53)  ECG 9/21: SB (59), nonsp ST changes  ECG 3/24: SR (79), normal  ECG 3/24: SR (79), normal  HM 9/21: SR, HR  (avg 67), SVT x12 (long 11b)  Echo 7/16: EF 55-60%, up nl LA size  Echo 1/18: EF 60-65%  24h HM 7/16: SR, HR  (avg 69)  ALEC 5/10: no ischemia  ETT 11/16: dyspnea, 1mm DS ST depression  Cath 3/11: LM ok, pLAD 30%, p-mLAD 50%, mLAD 40%, p-mLCx 40%, RCA ok, EF 55%, no MR, no AI, no AS, nl Ao  Cath 3/17: LM ok, pLAD 30%, p-mLAD 50%, mLAD 40%, p-mLCx 40%, RCA ok, EF 55%, no AS/AI, no MR  CXR 7/22: no acute abnl  CXR 1/24: no acute abnl  CTA chest 11/16: no PE, nl Ao, coronary calcium, slight mosaic atten of lungs may be related to RAD  Sleep study 3/19: mild MARIFER  MRI brain 6/19: no acute abnl  Carotid US 7/16: no HDSS     Review of Systems   Constitutional: Negative for chills, fever and malaise/fatigue.   HENT:  Negative for hearing loss.    Eyes:  Negative for visual disturbance.   Respiratory:  Negative for cough, hemoptysis and wheezing.    Skin:  Negative for rash.   Musculoskeletal:  Negative for falls and myalgias.   Gastrointestinal:  Negative for bloating, abdominal pain, constipation, diarrhea, dysphagia, nausea and vomiting.   Genitourinary:  Negative for dysuria and hematuria.   Neurological:  Negative for headaches.   Psychiatric/Behavioral:  Negative for altered mental status, depression and memory loss.       Social History     Tobacco Use    Smoking status: Former     Types:  Cigarettes    Smokeless tobacco: Never   Substance Use Topics    Alcohol use: Yes     Alcohol/week: 3.0 standard drinks of alcohol     Types: 3 Shots of liquor per week      Family History   Problem Relation Name Age of Onset    Other (cardiac disorder) Father      Other (hypertension) Father      Heart attack Father      Other (Cardiac defibrilator) Father      Hypertension Father's Brother      Hypertension Paternal Grandmother      Hypertension Paternal Grandfather        Allergies   Allergen Reactions    Latex Itching    Ragweed Itching     Other reaction(s): Other: See Comments   sinus      Current Outpatient Medications   Medication Instructions    aspirin 81 mg EC tablet 1 tablet, oral, Daily    atorvastatin (LIPITOR) 80 mg, oral, Daily RT    blood sugar diagnostic (Blood Glucose Test) strip Check fasting glucose at least once a week, and 2 hours after a meal at least once daily. Dx: DM-2 (E11.9).    clindamycin (Cleocin T) 1 % lotion Topical    fluticasone (Flonase) 50 mcg/actuation nasal spray 1 spray, Each Nostril, 2 times daily, Shake gently. Before first use, prime pump. After use, clean tip and replace cap.    ketoconazole (NIZOral) 2 % cream Topical, 2 times daily    melatonin 5 mg tablet 1 tablet, oral, Nightly    nitroglycerin (NITROSTAT) 0.4 mg    pantoprazole (PROTONIX) 20 mg, oral    Pataday Once Daily Relief 0.2 % ophthalmic solution     triamcinolone (Nasacort) 55 mcg nasal inhaler 2 sprays, Each Nostril, Daily      Vitals:    03/11/24 1624   BP: 132/81   Pulse: 79   SpO2: 93%      Physical Exam  Constitutional:       Appearance: Normal appearance.   HENT:      Head: Normocephalic and atraumatic.      Nose: Nose normal.   Neck:      Vascular: No carotid bruit.   Cardiovascular:      Rate and Rhythm: Normal rate and regular rhythm.      Heart sounds: No murmur heard.  Pulmonary:      Effort: Pulmonary effort is normal.      Breath sounds: Normal breath sounds.   Abdominal:      Palpations:  Abdomen is soft.      Tenderness: There is no abdominal tenderness.   Musculoskeletal:      Right lower leg: No edema.      Left lower leg: No edema.   Skin:     General: Skin is warm and dry.   Neurological:      General: No focal deficit present.      Mental Status: He is alert.   Psychiatric:         Mood and Affect: Mood normal.         Judgment: Judgment normal.        Results/Data  1/24 Cr 0.8, K 4.0, LFT nl, LDL 80, HDL 47, , Chol 147  12/21 hgba1c 6.8  7/21 Cr 0.74, K 4.2, LFT nl, LDL 69, HDL 35, TG 89, Chol 122, HGB 15.4, , hgba1c 6.8  3/20 Cr 0.95, K 4.3, LFT nl, hgba1c 6.5  2/20 CRP 0.3  6/19 Cr 0.78, K 4.1, LFT nl, LDL 69, HDL 38, TG 81, Chol 123, HGB 15.1, , hgba1c 6.2, TSH 1.45  3/19 Cr 0.69, K 4.0, Mg 1.9, LFT nl, LDL 58, HDL 37, TG 70, Chol 109, HGB 16, , hgba1c 6.3, TSH 1.2  3/18 Cr 0.69, K 4.3, HGB 16,   12/17 Cr 0.8, K 4.3, LFT nl, LDL 48, HDL 49, TG 98, Chol 117, HGB 16.4, , hgba1c 6.3  3/17 Cr 0.75, K 4.0, LFT nl, LDL 94, HDL 36, , Chol 175, HGB 15.2,   11/16 Cr 0.91, K 4.2, HGB 14.8, , TPN neg, BNP 30  7/16 Cr 0.83, K 4.2, LFT nl, , HDL 42, , CHol 181, hgba1c 6.0     Assessment/Plan   53 yo WM w/ h/o CAD, bord HPL, mild MARIFER (intol CPAP), GERD, OM, TOB (cigars, 20 yr, quit '09). Doing well. No sig cardiac symptoms. ECG today normal.  Cath 3/17 with at most 50% stenosis.  He sleeps with wedge pillow to keep him on his side (for snoring/mild MARIFER).  -continue ASA 81 qd  -resume statin - will use Rosuva 40 qhs (if intolerant, go back to Atorva 80 qhs) -> goal LDL <70  -f/u 1 year (earlier if needed)     Marques Gilmore MD

## 2024-03-14 LAB
ATRIAL RATE: 79 BPM
P AXIS: 45 DEGREES
P OFFSET: 181 MS
P ONSET: 133 MS
PR INTERVAL: 170 MS
Q ONSET: 218 MS
QRS COUNT: 13 BEATS
QRS DURATION: 84 MS
QT INTERVAL: 348 MS
QTC CALCULATION(BAZETT): 399 MS
QTC FREDERICIA: 381 MS
R AXIS: 38 DEGREES
T AXIS: 72 DEGREES
T OFFSET: 392 MS
VENTRICULAR RATE: 79 BPM

## 2024-03-25 ENCOUNTER — TELEPHONE (OUTPATIENT)
Dept: CARDIOLOGY | Facility: HOSPITAL | Age: 54
End: 2024-03-25

## 2024-03-25 ENCOUNTER — HOSPITAL ENCOUNTER (EMERGENCY)
Facility: HOSPITAL | Age: 54
Discharge: HOME | End: 2024-03-25
Payer: COMMERCIAL

## 2024-03-25 ENCOUNTER — HOSPITAL ENCOUNTER (EMERGENCY)
Facility: HOSPITAL | Age: 54
Discharge: HOME | End: 2024-03-25
Attending: FAMILY MEDICINE
Payer: COMMERCIAL

## 2024-03-25 ENCOUNTER — ALLIED HEALTH (OUTPATIENT)
Dept: INTEGRATIVE MEDICINE | Facility: CLINIC | Age: 54
End: 2024-03-25
Payer: COMMERCIAL

## 2024-03-25 ENCOUNTER — APPOINTMENT (OUTPATIENT)
Dept: RADIOLOGY | Facility: HOSPITAL | Age: 54
End: 2024-03-25
Payer: COMMERCIAL

## 2024-03-25 VITALS
WEIGHT: 281.09 LBS | OXYGEN SATURATION: 97 % | HEART RATE: 62 BPM | BODY MASS INDEX: 41.63 KG/M2 | TEMPERATURE: 97.2 F | SYSTOLIC BLOOD PRESSURE: 160 MMHG | DIASTOLIC BLOOD PRESSURE: 85 MMHG | HEIGHT: 69 IN | RESPIRATION RATE: 18 BRPM

## 2024-03-25 DIAGNOSIS — I10 PRIMARY HYPERTENSION: ICD-10-CM

## 2024-03-25 DIAGNOSIS — R20.2 TINGLING OF FACE: ICD-10-CM

## 2024-03-25 DIAGNOSIS — R42 DIZZINESS: Primary | ICD-10-CM

## 2024-03-25 DIAGNOSIS — M99.01 SEGMENTAL DYSFUNCTION OF CERVICAL REGION: Primary | ICD-10-CM

## 2024-03-25 DIAGNOSIS — M47.812 CERVICAL SPONDYLOSIS WITHOUT MYELOPATHY: ICD-10-CM

## 2024-03-25 DIAGNOSIS — M99.00 SEGMENTAL DYSFUNCTION OF HEAD REGION: ICD-10-CM

## 2024-03-25 DIAGNOSIS — R42 LIGHTHEADEDNESS: ICD-10-CM

## 2024-03-25 DIAGNOSIS — G25.89 SCAPULAR DYSKINESIS: ICD-10-CM

## 2024-03-25 DIAGNOSIS — M99.02 SEGMENTAL AND SOMATIC DYSFUNCTION OF THORACIC REGION: ICD-10-CM

## 2024-03-25 DIAGNOSIS — E11.65 HYPERGLYCEMIA DUE TO DIABETES MELLITUS (MULTI): ICD-10-CM

## 2024-03-25 DIAGNOSIS — M26.629 TMJ PAIN DYSFUNCTION SYNDROME: ICD-10-CM

## 2024-03-25 LAB
ALBUMIN SERPL BCP-MCNC: 4.1 G/DL (ref 3.4–5)
ALP SERPL-CCNC: 58 U/L (ref 33–120)
ALT SERPL W P-5'-P-CCNC: 18 U/L (ref 10–52)
ANION GAP SERPL CALC-SCNC: 12 MMOL/L (ref 10–20)
AST SERPL W P-5'-P-CCNC: 14 U/L (ref 9–39)
BASOPHILS # BLD AUTO: 0.05 X10*3/UL (ref 0–0.1)
BASOPHILS NFR BLD AUTO: 0.7 %
BILIRUB SERPL-MCNC: 0.5 MG/DL (ref 0–1.2)
BUN SERPL-MCNC: 13 MG/DL (ref 6–23)
CALCIUM SERPL-MCNC: 8.9 MG/DL (ref 8.6–10.3)
CARDIAC TROPONIN I PNL SERPL HS: 5 NG/L (ref 0–20)
CHLORIDE SERPL-SCNC: 102 MMOL/L (ref 98–107)
CO2 SERPL-SCNC: 27 MMOL/L (ref 21–32)
CREAT SERPL-MCNC: 0.79 MG/DL (ref 0.5–1.3)
EGFRCR SERPLBLD CKD-EPI 2021: >90 ML/MIN/1.73M*2
EOSINOPHIL # BLD AUTO: 0.18 X10*3/UL (ref 0–0.7)
EOSINOPHIL NFR BLD AUTO: 2.6 %
ERYTHROCYTE [DISTWIDTH] IN BLOOD BY AUTOMATED COUNT: 12.8 % (ref 11.5–14.5)
GLUCOSE SERPL-MCNC: 161 MG/DL (ref 74–99)
HCT VFR BLD AUTO: 48.6 % (ref 41–52)
HGB BLD-MCNC: 16.3 G/DL (ref 13.5–17.5)
IMM GRANULOCYTES # BLD AUTO: 0.02 X10*3/UL (ref 0–0.7)
IMM GRANULOCYTES NFR BLD AUTO: 0.3 % (ref 0–0.9)
LYMPHOCYTES # BLD AUTO: 1.72 X10*3/UL (ref 1.2–4.8)
LYMPHOCYTES NFR BLD AUTO: 24.6 %
MCH RBC QN AUTO: 29.7 PG (ref 26–34)
MCHC RBC AUTO-ENTMCNC: 33.5 G/DL (ref 32–36)
MCV RBC AUTO: 89 FL (ref 80–100)
MONOCYTES # BLD AUTO: 0.59 X10*3/UL (ref 0.1–1)
MONOCYTES NFR BLD AUTO: 8.5 %
NEUTROPHILS # BLD AUTO: 4.42 X10*3/UL (ref 1.2–7.7)
NEUTROPHILS NFR BLD AUTO: 63.3 %
NRBC BLD-RTO: NORMAL /100{WBCS}
PLATELET # BLD AUTO: 265 X10*3/UL (ref 150–450)
POTASSIUM SERPL-SCNC: 4 MMOL/L (ref 3.5–5.3)
PROT SERPL-MCNC: 7.1 G/DL (ref 6.4–8.2)
RBC # BLD AUTO: 5.49 X10*6/UL (ref 4.5–5.9)
SODIUM SERPL-SCNC: 137 MMOL/L (ref 136–145)
WBC # BLD AUTO: 7 X10*3/UL (ref 4.4–11.3)

## 2024-03-25 PROCEDURE — 98941 CHIROPRACT MANJ 3-4 REGIONS: CPT | Performed by: CHIROPRACTOR

## 2024-03-25 PROCEDURE — 70450 CT HEAD/BRAIN W/O DYE: CPT | Performed by: RADIOLOGY

## 2024-03-25 PROCEDURE — 70450 CT HEAD/BRAIN W/O DYE: CPT

## 2024-03-25 PROCEDURE — 99284 EMERGENCY DEPT VISIT MOD MDM: CPT | Mod: 25

## 2024-03-25 PROCEDURE — 80053 COMPREHEN METABOLIC PANEL: CPT | Performed by: FAMILY MEDICINE

## 2024-03-25 PROCEDURE — 36415 COLL VENOUS BLD VENIPUNCTURE: CPT | Performed by: FAMILY MEDICINE

## 2024-03-25 PROCEDURE — 84484 ASSAY OF TROPONIN QUANT: CPT | Performed by: FAMILY MEDICINE

## 2024-03-25 PROCEDURE — 85025 COMPLETE CBC W/AUTO DIFF WBC: CPT | Performed by: FAMILY MEDICINE

## 2024-03-25 PROCEDURE — 4500999001 HC ED NO CHARGE

## 2024-03-25 ASSESSMENT — COLUMBIA-SUICIDE SEVERITY RATING SCALE - C-SSRS
2. HAVE YOU ACTUALLY HAD ANY THOUGHTS OF KILLING YOURSELF?: NO
1. IN THE PAST MONTH, HAVE YOU WISHED YOU WERE DEAD OR WISHED YOU COULD GO TO SLEEP AND NOT WAKE UP?: NO
6. HAVE YOU EVER DONE ANYTHING, STARTED TO DO ANYTHING, OR PREPARED TO DO ANYTHING TO END YOUR LIFE?: NO

## 2024-03-25 ASSESSMENT — PAIN SCALES - GENERAL: PAINLEVEL_OUTOF10: 0 - NO PAIN

## 2024-03-25 ASSESSMENT — PAIN - FUNCTIONAL ASSESSMENT: PAIN_FUNCTIONAL_ASSESSMENT: 0-10

## 2024-03-25 NOTE — DISCHARGE INSTRUCTIONS
You were seen today after being sent by your doctor for tingling of the right mouth with some drooling, and an episode 2 days ago of abnormal speech.    Your exam and workup today were normal.  CT of your head was negative for acute changes including stroke.  Your labs, however, demonstrated an elevated glucose of 161.  Given the recent history of a hemoglobin A1c of 6.6, this is diabetes.    Follow-up with your PCP within the next 7 to 10 days for ongoing management of your current symptoms and of your diabetes.  Strongly recommend you discuss with your physician your blood pressure, and the use of a GLP-1 drug for both blood sugar control and weight loss.

## 2024-03-25 NOTE — PROGRESS NOTES
Subjective   Patient ID: Benjamín Sahu is a 54 y.o. male who presents March 25, 2024 for chiropractic care.    (3/15) [facility]    Today, the patient rates their degree of pain as a 5 out of 10 on the numeric pain rating scale.     Benjamín returns for continued chiropractic care. He states that since his last appointment, he was let go from his job. Today, he presents with right sided neck, jaw and head pain. He states that the side of his face feels numb and that over the weekend he had a severe right sided headache, visual disturbance and slurred speech. The patient denies any changes in health since his last encounter and will follow up as scheduled.      Objective   Physical Exam  Neurological:      General: No focal deficit present.      Mental Status: He is alert and oriented to person, place, and time.      Cranial Nerves: No dysarthria or facial asymmetry.      Sensory: Sensation is intact.      Motor: Motor function is intact.      Coordination: Coordination is intact.      Gait: Gait is intact.       Upon visual examination and subjective questioning, Benjamín does not present with any unilateral fascial distortion. His speech is clear and he states that he is not experiencing visual disruptions at the moment. He states that the numbness along the right side is still present, in addition to jaw pain. He also presents with mild dizziness upon standing.     Palpation of the following region(s) revealed:  Cervical: Scalenes bilateral, muscular hypertonicity.  Upper trapezius bilateral, muscular hypertonicity.  Levator scapulae bilateral, muscular hypertonicity.  Cervical paraspinals bilateral, muscular hypertonicity.  Thoracic: Thoracic paraspinals bilateral, muscular hypertonicity.  Middle trapezius bilateral, muscular hypertonicity.  Rhomboids bilateral, muscular hypertonicity.    Segmental Joint(s): Segmental joint dysfunction was assessed with motion palpation and is identified in the following areas:  Cervical : C2  C4 C5  Thoracic : T2., T4, and T5    Assessment/Plan   Today's Treatment Included: Chiropractic manipulation to the Segmental Joint(s) Cervical : C5 C6  Segmental Joint(s) Thoracic : T1 and T2.   Treatment Techniques Used : Manual traction  Pin and stretch  Integrative Dry Needling (IDN) - Needles in / out:  12.  NO HVLA PERFORMED     Soft-tissue mobilization was performed in the following areas:   Cervical paraspinal mm. bilateral, Scalenes bilateral, Upper Trapezius bilateral, and Levator Scap. bilateral  Thoracic Paraspinal mm. bilateral, Middle Trapezius bilateral, and Rhomboids bilateral    The patient tolerated today's treatment with little or no additional discomfort and was instructed to contact the office for questions or concerns.     Encouraged the patient to head directly to [facility] ED to be evaluated for numbness along the side of the face, slurred speech patterns, visual disruption and headache.
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
## 2024-03-26 DIAGNOSIS — I25.10 CORONARY ARTERY CALCIFICATION SEEN ON CT SCAN: Primary | ICD-10-CM

## 2024-03-26 DIAGNOSIS — E11.9 DIABETES MELLITUS TYPE II, NON INSULIN DEPENDENT (MULTI): ICD-10-CM

## 2024-03-26 RX ORDER — SEMAGLUTIDE 0.68 MG/ML
0.5 INJECTION, SOLUTION SUBCUTANEOUS
Qty: 3 ML | Refills: 0 | Status: SHIPPED | OUTPATIENT
Start: 2024-03-26 | End: 2024-04-17 | Stop reason: WASHOUT

## 2024-03-26 NOTE — TELEPHONE ENCOUNTER
Phoned patient and no answer.  Left voicemail with update per Dr. Gilmore notation that order was placed for Ozempic. Provided contact number for cardiology nursing office as part of voicemail mail.

## 2024-03-26 NOTE — TELEPHONE ENCOUNTER
Phoned patient and no answer.  Left voicemail with contact number for cardiology nursing office ([phone]) and requested patient return call to nursing office.

## 2024-03-26 NOTE — TELEPHONE ENCOUNTER
Patient returned call to nursing office.  Patient inquiring if Ozempic would be appropriate for patient to take and if so can Dr. Gilmore order.  Updated patient that Dr. Gilmore currently out of the office and this message would be forwarded to Dr. Gilmore for review.

## 2024-03-27 NOTE — TELEPHONE ENCOUNTER
Patient returned call to nursing office. Provided patient update regarding insurance denial of coverage for Ozempic and patient verbalized understanding.

## 2024-03-27 NOTE — TELEPHONE ENCOUNTER
Received notification from insurance that Ozempic was denied for coverage at this time.  Denial letter to be scanned to [facility] and documentation forwarded to Dr. Gilmore.  Phoned patient and no answer.  Left voicemail with contact number for cardiology nursing office and update regarding denial of coverage for Ozempic.

## 2024-04-01 ENCOUNTER — TREATMENT (OUTPATIENT)
Dept: PHYSICAL THERAPY | Facility: CLINIC | Age: 54
End: 2024-04-01
Payer: COMMERCIAL

## 2024-04-01 DIAGNOSIS — S46.012A STRAIN OF ROTATOR CUFF OF LEFT SHOULDER: Primary | ICD-10-CM

## 2024-04-01 PROCEDURE — 97110 THERAPEUTIC EXERCISES: CPT | Mod: GP | Performed by: PHYSICAL THERAPIST

## 2024-04-01 NOTE — PROGRESS NOTES
Physical Therapy    Physical Therapy Treatment    Patient Name: Benjamín Sahu  MRN: [id number]  Today's Date: 4/1/2024  Visit: 2/8  Insurance: [employer]  Authorization: 3/6/24-6/6/24     PT Therapeutic Procedures Time Entry  Therapeutic Exercise Time Entry: 30    Good understanding and compliance with HEP.       Plan: Continue with POC          Current Problem  1. Strain of rotator cuff of left shoulder            General          Subjective    Shoulder pain 4/10 at worst     Precautions: fall risk           Objective     Independent with HEP     Treatments:  Pulleys 5 minutes  Ball rolls on table 20X each  Resisted retraction and extension with red theraband 20X  Resisisted shoulder er and abd with red theraband 15X each  Reviewed HEP  Supine shoulder press, flies and triceps 2 X 10 with 3#  UBE 5 minutes      OP EDUCATION: HEP       Goals:  Active       PT Problem       PT Goal 1       Start:  03/06/24    Expected End:  06/06/24       Shoulder pain 2/10 or less         PT Goal 2       Start:  03/06/24    Expected End:  06/06/24       Improve quickdash by 10%         PT Goal 3       Start:  03/06/24    Expected End:  06/06/24       Shoulder elevation ROM to 160         PT Goal 4       Start:  03/06/24    Expected End:  06/06/24       5/5 shoulder strength

## 2024-04-08 ENCOUNTER — APPOINTMENT (OUTPATIENT)
Dept: PHYSICAL THERAPY | Facility: CLINIC | Age: 54
End: 2024-04-08
Payer: COMMERCIAL

## 2024-04-16 ENCOUNTER — ALLIED HEALTH (OUTPATIENT)
Dept: INTEGRATIVE MEDICINE | Facility: CLINIC | Age: 54
End: 2024-04-16
Payer: COMMERCIAL

## 2024-04-16 DIAGNOSIS — M99.00 SEGMENTAL DYSFUNCTION OF HEAD REGION: ICD-10-CM

## 2024-04-16 DIAGNOSIS — M99.01 SEGMENTAL DYSFUNCTION OF CERVICAL REGION: Primary | ICD-10-CM

## 2024-04-16 DIAGNOSIS — M47.812 CERVICAL SPONDYLOSIS WITHOUT MYELOPATHY: ICD-10-CM

## 2024-04-16 DIAGNOSIS — G25.89 SCAPULAR DYSKINESIS: ICD-10-CM

## 2024-04-16 DIAGNOSIS — M99.02 SEGMENTAL AND SOMATIC DYSFUNCTION OF THORACIC REGION: ICD-10-CM

## 2024-04-16 PROCEDURE — 98941 CHIROPRACT MANJ 3-4 REGIONS: CPT | Performed by: CHIROPRACTOR

## 2024-04-16 NOTE — PROGRESS NOTES
Subjective   Patient ID: Benjamín Sahu is a 54 y.o. male who presents April 16, 2024 for chiropractic care.    (4/15) [facility]PCY    Today, the patient rates their degree of pain as a 5 out of 10 on the numeric pain rating scale.     Benjamín returns for continued chiropractic care. He states that he continues to have neck and upper back discomfort. He confirms that he did follow up for further evaluation after his last treatment and the physician did not find any indication that Benjamín experienced a stroke. The patient denies any changes in health since his last encounter and will follow up as scheduled.      Objective   Physical Exam  Neurological:      General: No focal deficit present.      Mental Status: He is alert and oriented to person, place, and time.      Cranial Nerves: No dysarthria or facial asymmetry.      Sensory: Sensation is intact.      Motor: Motor function is intact.      Coordination: Coordination is intact.      Gait: Gait is intact.       Palpation of the following region(s) revealed:  Cervical: Scalenes bilateral, muscular hypertonicity.  Upper trapezius bilateral, muscular hypertonicity.  Levator scapulae bilateral, muscular hypertonicity.  Cervical paraspinals bilateral, muscular hypertonicity.  Thoracic: Thoracic paraspinals bilateral, muscular hypertonicity.  Middle trapezius bilateral, muscular hypertonicity.  Rhomboids bilateral, muscular hypertonicity.    Segmental Joint(s): Segmental joint dysfunction was assessed with motion palpation and is identified in the following areas:  Cervical : C2 C4 C5  Thoracic : T2., T4, and T5    Assessment/Plan   Today's Treatment Included: Chiropractic manipulation to the Segmental Joint(s) Cervical : C2 C5 C6  Segmental Joint(s) Thoracic : T2., T4, and T5   Treatment Techniques Used : Manual traction and Low force  Pin and stretch  Integrative Dry Needling (IDN) - Needles in / out:  12.    Soft-tissue mobilization was performed in the following areas:   Cervical  paraspinal mm. bilateral, Scalenes bilateral, Upper Trapezius bilateral, and Levator Scap. bilateral  Thoracic Paraspinal mm. bilateral, Middle Trapezius bilateral, and Rhomboids bilateral    The patient tolerated today's treatment with little or no additional discomfort and was instructed to contact the office for questions or concerns.

## 2024-04-17 ENCOUNTER — OFFICE VISIT (OUTPATIENT)
Dept: PRIMARY CARE | Facility: CLINIC | Age: 54
End: 2024-04-17
Payer: COMMERCIAL

## 2024-04-17 VITALS
BODY MASS INDEX: 41.32 KG/M2 | SYSTOLIC BLOOD PRESSURE: 130 MMHG | OXYGEN SATURATION: 96 % | HEIGHT: 69 IN | DIASTOLIC BLOOD PRESSURE: 80 MMHG | HEART RATE: 68 BPM | WEIGHT: 279 LBS | TEMPERATURE: 97.3 F

## 2024-04-17 DIAGNOSIS — D84.9 IMMUNE DEFICIENCY DISORDER (MULTI): ICD-10-CM

## 2024-04-17 DIAGNOSIS — Z00.00 ANNUAL PHYSICAL EXAM: ICD-10-CM

## 2024-04-17 DIAGNOSIS — E66.01 CLASS 3 SEVERE OBESITY WITH SERIOUS COMORBIDITY AND BODY MASS INDEX (BMI) OF 40.0 TO 44.9 IN ADULT, UNSPECIFIED OBESITY TYPE (MULTI): ICD-10-CM

## 2024-04-17 DIAGNOSIS — E11.9 TYPE 2 DIABETES MELLITUS WITHOUT COMPLICATION, WITHOUT LONG-TERM CURRENT USE OF INSULIN (MULTI): Primary | ICD-10-CM

## 2024-04-17 PROCEDURE — 3079F DIAST BP 80-89 MM HG: CPT | Performed by: FAMILY MEDICINE

## 2024-04-17 PROCEDURE — 3048F LDL-C <100 MG/DL: CPT | Performed by: FAMILY MEDICINE

## 2024-04-17 PROCEDURE — 3044F HG A1C LEVEL LT 7.0%: CPT | Performed by: FAMILY MEDICINE

## 2024-04-17 PROCEDURE — 3008F BODY MASS INDEX DOCD: CPT | Performed by: FAMILY MEDICINE

## 2024-04-17 PROCEDURE — 99213 OFFICE O/P EST LOW 20 MIN: CPT | Performed by: FAMILY MEDICINE

## 2024-04-17 PROCEDURE — 3075F SYST BP GE 130 - 139MM HG: CPT | Performed by: FAMILY MEDICINE

## 2024-04-17 PROCEDURE — 1036F TOBACCO NON-USER: CPT | Performed by: FAMILY MEDICINE

## 2024-04-17 NOTE — PROGRESS NOTES
"Subjective   Patient ID: Benjamín Sahu is a 54 y.o. male who presents for Follow-up (Wt loss, sinus infections).  Saw his cardiologist  Recommended that he try and lose weight  Unsuccessfully tried to get weight loss drugs approved very pleasant 54-year-old with history of diabetes  Heart disease hypertension hyperlipidemia and obesity  Longstanding history of angina  Is here today he would like to lose weight his cardiologist recommended he loses weight thinks that it would be very good for him given his diabetes and heart disease the cardiologist tried prescribing one of the new weight loss drugs and it was denied by his insurance so the patient states that his cardiologist told him to \"check with his primary maybe they can get it approved\" that is why he is here today he is also overdue for an annual physical and blood work check and health screening he has had a high BMI and has been working on trying to lose weight for a very long time and it has been a struggle for him he has joint pain sleep apnea Borderline hypertension heart disease and has not had any recent episodes of angina        Review of Systems  Constitutional: no chills, no fever and no night sweats.   Eyes: no blurred vision and no eyesight problems.   ENT: no hearing loss, no nasal congestion, no nasal discharge, no hoarseness and no sore throat.   Cardiovascular: no chest pain, no intermittent leg claudication, no lower extremity edema, no palpitations and no syncope.   Respiratory: no cough, no shortness of breath during exertion, no shortness of breath at rest and no wheezing.   Gastrointestinal: no abdominal pain, no blood in stools, no constipation, no diarrhea, no melena, no nausea, no rectal pain and no vomiting.   Genitourinary: no dysuria, no change in urinary frequency, no urinary hesitancy, no feelings of urinary urgency and no vaginal discharge.   Musculoskeletal: no arthralgias,  no back pain and no myalgias.   Integumentary: no new " "skin lesions and no rashes.   Neurological: no difficulty walking, no headache, no limb weakness, no numbness and no tingling.   Psychiatric: no anxiety, no depression, no anhedonia and no substance use disorders.   Endocrine: no recent weight gain and no recent weight loss.   Hematologic/Lymphatic: no tendency for easy bruising and no swollen glands .    Objective    /80   Pulse 68   Temp 36.3 °C (97.3 °F)   Ht 1.753 m (5' 9\")   Wt 127 kg (279 lb)   SpO2 96%   BMI 41.20 kg/m²    Physical Exam  The patient appeared well nourished and normally developed. Vital signs as documented. Head exam is unremarkable. No scleral icterus or corneal arcus noted.  Pupils are equal round reactive to light extraocular movements are intact no hemorrhages noted on funduscopic exam mouth mucous membranes are moist no exudates ears canals clear TMs are gray pearly not injected nose no rhinorrhea or epistaxis Neck is without jugular venous distension, thyromegaly, or carotid bruits. Carotid upstrokes are brisk bilaterally. Lungs are clear to auscultation and percussion. Cardiac exam reveals the PMI to be normally sized and situated. Rhythm is regular. First and second heart sounds normal. No murmurs, rubs or gallops. Abdominal exam reveals normal bowel sounds, no masses, no organomegaly and no aortic enlargement. Extremities are nonedematous and both femoral and pedal pulses are normal.  Neurologic exam DTRs are equal bilaterally no focal deficits strength is symmetrical heme lymph no palpable lymph nodes in the neck axilla or groin  Barefoot exam good pulses S good capillary refill no ulcers no focal neurologic signs   Assessment/Plan   Problem List Items Addressed This Visit       Immune deficiency disorder (Multi)     Stable and controlled based on symptoms and exam  Continue current medication and management         Class 3 severe obesity with serious comorbidity and body mass index (BMI) of 40.0 to 44.9 in adult (Multi) "     Above normal BMI nutrition and physical activity reviewed         Type 2 diabetes mellitus without complication, without long-term current use of insulin (Multi) - Primary    Relevant Medications    semaglutide 0.25 mg or 0.5 mg (2 mg/3 mL) pen injector    Annual physical exam        Type 2 diabetes last A1c was acceptable  Weight loss will certainly help with multiple factors including hypertension heart disease overall health in general less joint pain and also help the sleep apnea  I will prescribe semaglutide but warned the patient that it might not be approved by his insurance and though this will help with weight loss there is no substitute for lifestyle changes reduce calorie diet and regular physical exercise    Janet Hooker MD

## 2024-05-09 PROBLEM — F32.0 MILD MAJOR DEPRESSION (CMS-HCC): Status: ACTIVE | Noted: 2024-05-09

## 2024-05-09 PROBLEM — R68.89 FLU-LIKE SYMPTOMS: Status: RESOLVED | Noted: 2024-01-28 | Resolved: 2024-05-09

## 2024-05-09 PROBLEM — Z00.00 ANNUAL PHYSICAL EXAM: Status: ACTIVE | Noted: 2024-05-09

## 2024-05-09 PROBLEM — E11.9 TYPE 2 DIABETES MELLITUS WITHOUT COMPLICATION, WITHOUT LONG-TERM CURRENT USE OF INSULIN (MULTI): Status: ACTIVE | Noted: 2024-05-09

## 2024-05-09 PROBLEM — F32.A DEPRESSION: Status: RESOLVED | Noted: 2023-09-27 | Resolved: 2024-05-09

## 2024-05-09 PROBLEM — S49.90XA: Status: RESOLVED | Noted: 2024-01-29 | Resolved: 2024-05-09

## 2024-05-09 PROBLEM — E66.813 CLASS 3 SEVERE OBESITY WITH SERIOUS COMORBIDITY AND BODY MASS INDEX (BMI) OF 40.0 TO 44.9 IN ADULT: Status: ACTIVE | Noted: 2023-09-27

## 2024-05-09 PROBLEM — E11.69 DIABETES MELLITUS TYPE 2 IN OBESE: Status: RESOLVED | Noted: 2023-09-27 | Resolved: 2024-05-09

## 2024-05-09 PROBLEM — E66.9 DIABETES MELLITUS TYPE 2 IN OBESE: Status: RESOLVED | Noted: 2023-09-27 | Resolved: 2024-05-09

## 2024-05-09 RX ORDER — ESCITALOPRAM OXALATE 20 MG/1
TABLET ORAL
COMMUNITY
Start: 2024-04-23

## 2024-05-09 RX ORDER — BREXPIPRAZOLE 1 MG/1
TABLET ORAL
COMMUNITY

## 2024-05-22 ENCOUNTER — TELEPHONE (OUTPATIENT)
Dept: PRIMARY CARE | Facility: CLINIC | Age: 54
End: 2024-05-22
Payer: COMMERCIAL

## 2024-05-22 NOTE — TELEPHONE ENCOUNTER
Called to let you know that the Ozempic was denied by his insurance and he was told to let you know.

## 2024-05-23 ENCOUNTER — ALLIED HEALTH (OUTPATIENT)
Dept: INTEGRATIVE MEDICINE | Facility: CLINIC | Age: 54
End: 2024-05-23
Payer: COMMERCIAL

## 2024-05-23 DIAGNOSIS — M99.02 SEGMENTAL AND SOMATIC DYSFUNCTION OF THORACIC REGION: ICD-10-CM

## 2024-05-23 DIAGNOSIS — M99.00 SEGMENTAL DYSFUNCTION OF HEAD REGION: ICD-10-CM

## 2024-05-23 DIAGNOSIS — M99.01 SEGMENTAL DYSFUNCTION OF CERVICAL REGION: Primary | ICD-10-CM

## 2024-05-23 DIAGNOSIS — M47.812 CERVICAL SPONDYLOSIS WITHOUT MYELOPATHY: ICD-10-CM

## 2024-05-23 DIAGNOSIS — M26.629 TMJ PAIN DYSFUNCTION SYNDROME: ICD-10-CM

## 2024-05-23 DIAGNOSIS — G25.89 SCAPULAR DYSKINESIS: ICD-10-CM

## 2024-05-23 PROCEDURE — 98941 CHIROPRACT MANJ 3-4 REGIONS: CPT | Performed by: CHIROPRACTOR

## 2024-05-23 NOTE — PROGRESS NOTES
Subjective   Patient ID: Benjamín Sahu is a 54 y.o. male who presents May 23, 2024 for chiropractic care.    (5/15) VPCY    Today, the patient rates their degree of pain as a 4 out of 10 on the numeric pain rating scale.     Benjamín returns for continued treatment of neck/upper back and jaw discomfort. He states that he has not been dizzy or has had jaw numbness since last visit. He states that they removed his top teeth on the left and that has changed his bite which has increased jaw symptoms. He states that he has neck/upper back tension, mostly on the right. Denies any associated symptoms or change in medical history since last visit and will follow up in 2-3 weeks.       Objective   Physical Exam  Neurological:      General: No focal deficit present.      Mental Status: He is alert and oriented to person, place, and time.      Cranial Nerves: No dysarthria or facial asymmetry.      Sensory: Sensation is intact.      Motor: Motor function is intact.      Coordination: Coordination is intact.      Gait: Gait is intact.       Palpation of the following region(s) revealed:  Cervical: Scalenes bilateral, muscular hypertonicity.  Upper trapezius bilateral, muscular hypertonicity.  Levator scapulae bilateral, muscular hypertonicity.  Cervical paraspinals bilateral, muscular hypertonicity.  Thoracic: Thoracic paraspinals bilateral, muscular hypertonicity.  Middle trapezius bilateral, muscular hypertonicity.  Rhomboids bilateral, muscular hypertonicity.    Segmental Joint(s): Segmental joint dysfunction was assessed with motion palpation and is identified in the following areas:  Cervical : C2 C4 C5  Thoracic : T2., T4, and T5    Assessment/Plan   Today's Treatment Included: Chiropractic manipulation to the Segmental Joint(s) Cervical : C2 C5 C6  Segmental Joint(s) Thoracic : T2., T4, and T5   Treatment Techniques Used : Manual traction and Low force  Pin and stretch  Integrative Dry Needling (IDN) - Needles in / out:  12.  CS paraspinals, suboccipitals bilateral.    Soft-tissue mobilization was performed in the following areas:   Cervical paraspinal mm. bilateral, Scalenes bilateral, Upper Trapezius bilateral, and Levator Scap. bilateral  Thoracic Paraspinal mm. bilateral, Middle Trapezius bilateral, and Rhomboids bilateral    The patient tolerated today's treatment with little or no additional discomfort and was instructed to contact the office for questions or concerns.

## 2024-06-07 ENCOUNTER — APPOINTMENT (OUTPATIENT)
Dept: OTOLARYNGOLOGY | Facility: CLINIC | Age: 54
End: 2024-06-07
Payer: COMMERCIAL

## 2024-06-11 ENCOUNTER — DOCUMENTATION (OUTPATIENT)
Dept: PHYSICAL THERAPY | Facility: CLINIC | Age: 54
End: 2024-06-11
Payer: COMMERCIAL

## 2024-06-11 NOTE — PROGRESS NOTES
Physical Therapy    Discharge Summary    Name: Benjamín Sahu  MRN: [id number]  : 1970  Date: 24    Discharge Summary: PT    Discharge Information: Date of discharge 24, Date of last visit 24, Date of evaluation 3/6/24, Number of attended visits 2, Referred by Carlos, and Referred for rtc strain    Therapy Summary: Patient seen in PT for 2 visits for rtc strain.  Patient did not follow up in PT after his second PT visit or reschedule.      Discharge Status: Status unknown.       Rehab Discharge Reason: Failed to schedule and/or keep follow-up appointment(s)

## 2024-06-12 ENCOUNTER — APPOINTMENT (OUTPATIENT)
Dept: INTEGRATIVE MEDICINE | Facility: CLINIC | Age: 54
End: 2024-06-12
Payer: COMMERCIAL

## 2024-06-12 ENCOUNTER — ALLIED HEALTH (OUTPATIENT)
Dept: INTEGRATIVE MEDICINE | Facility: CLINIC | Age: 54
End: 2024-06-12
Payer: COMMERCIAL

## 2024-06-12 DIAGNOSIS — M26.629 TMJ PAIN DYSFUNCTION SYNDROME: ICD-10-CM

## 2024-06-12 DIAGNOSIS — M99.01 SEGMENTAL DYSFUNCTION OF CERVICAL REGION: Primary | ICD-10-CM

## 2024-06-12 DIAGNOSIS — M99.02 SEGMENTAL AND SOMATIC DYSFUNCTION OF THORACIC REGION: ICD-10-CM

## 2024-06-12 DIAGNOSIS — M99.00 SEGMENTAL DYSFUNCTION OF HEAD REGION: ICD-10-CM

## 2024-06-12 DIAGNOSIS — M47.812 CERVICAL SPONDYLOSIS WITHOUT MYELOPATHY: ICD-10-CM

## 2024-06-12 PROCEDURE — 98941 CHIROPRACT MANJ 3-4 REGIONS: CPT | Performed by: CHIROPRACTOR

## 2024-06-12 NOTE — PROGRESS NOTES
Subjective   Patient ID: Benjamín Shau is a 54 y.o. male who presents June 12, 2024 for neck, upper back and jaw pain.    (6/15) [facility]    Today, the patient rates their degree of pain as a 6 out of 10 on the numeric pain rating scale.     HPI - Benjamín returns to my office for chiropractic care with main complaints of neck and jaw pain. He recently had a tooth removed in the upper left quadrant which has affected his bite. He reports that he is now shifting his jaw to the opposite side and wakes up with increased jaw pain and tension. He reports ongoing neck tension and headaches. Reports F/U scheduled with orthodontist in August. Denies new trauma/incident.    No other changes in health reported.       Objective   Physical Exam  Neurological:      General: No focal deficit present.      Mental Status: He is alert and oriented to person, place, and time.      Cranial Nerves: No dysarthria or facial asymmetry.      Sensory: Sensation is intact.      Motor: Motor function is intact.      Coordination: Coordination is intact.      Gait: Gait is intact.         Palpation of the following region(s) revealed:  Cervical: Suboccipitals bilateral, hypertonicity and tenderness.  Temporalis right, muscular hypertonicity.  SCM right, hypertonicity and tenderness.  Upper trapezius bilateral, hypertonicity and tenderness.  Cervical paraspinals bilateral, hypertonicity and tenderness.  Muscles of mastication right, hypertonicity and tenderness.  Thoracic: Middle trapezius bilateral, muscular hypertonicity.  Rhomboids bilateral, muscular hypertonicity.  Pectoralis bilateral, muscular hypertonicity.        Segmental Joint(s): Segmental joint dysfunction was assessed with motion palpation and is identified in the following areas:  Cervical : C0 C1 C5  Thoracic : T2., T3, T5, and T6      Assessment/Plan   Today's Treatment Included: Chiropractic manipulation to the Segmental Joint(s) Cervical : C0 C1 C5  Segmental Joint(s) Thoracic : T2.,  T3, T5, and T6   Treatment Techniques Used : Activator/Tool assisted technique, Manual traction, and Low force  Integrative Dry Needling (IDN) - Needles in / out:  6.  IDN: BL suboccipitals, R masseter, BL upper trap    Supine activator to jaw - BL    Soft-tissue mobilization was performed in the following areas:   Suboccipital bilateral, Cervical paraspinal mm. bilateral, SCM bilateral, Upper Trapezius bilateral, Temporalis bilateral, and Masseter bilateral  Levator Scap. bilateral, Rhomboids bilateral, and Pectoralis bilateral      F/U in 2 weeks or as-needed    The patient tolerated today's treatment with little or no additional discomfort and was instructed to contact the office for questions or concerns.

## 2024-06-19 NOTE — PROGRESS NOTES
History of present illness:  Benjamín Sahu is a 54 y.o. male presenting today for follow-up.     Patient's right ear is causing him some discomfort, itching and pain.     RECALL 2/5/24  Benjamín Sahu is a 54 y.o. male presenting today for follow-up.  Overall he is doing well. He continues to complain of intermittent or occasional popping sensation in his ears. He denies any drainage. Hearing seems to be stable.   The patient's current medications, active allergies and list of medical problems were reviewed in the EHR and confirmed electronically there are as follows;  Allergies:   Allergies   Allergen Reactions    Latex Itching    Ragweed Itching     Other reaction(s): Other: See Comments   sinus     Current list of medications:   Current Outpatient Medications   Medication Sig Dispense Refill    aspirin 81 mg EC tablet Take 1 tablet (81 mg) by mouth once daily. 90 tablet 3    blood sugar diagnostic (Blood Glucose Test) strip Check fasting glucose at least once a week, and 2 hours after a meal at least once daily. Dx: DM-2 (E11.9).      clindamycin (Cleocin T) 1 % lotion Apply topically.      escitalopram (Lexapro) 20 mg tablet PLEASE SEE ATTACHED FOR DETAILED DIRECTIONS      fluticasone (Flonase) 50 mcg/actuation nasal spray Administer 1 spray into each nostril 2 times a day. Shake gently. Before first use, prime pump. After use, clean tip and replace cap. 48 g 11    ketoconazole (NIZOral) 2 % cream Apply topically 2 times a day.      melatonin 5 mg tablet Take 1 tablet (5 mg) by mouth once daily at bedtime.      nitroglycerin (Nitrostat) 0.4 mg SL tablet 1 tablet (0.4 mg).      pantoprazole (ProtoNix) 20 mg EC tablet Take 1 tablet (20 mg) by mouth.      Pataday Once Daily Relief 0.2 % ophthalmic solution       Rexulti 1 mg tablet TAKE 1 MG BY MOUTH DAILY INDICATIONS: ADDITIONAL TREATMENT FOR MAJOR DEPRESSIVE DISORDER      rosuvastatin (Crestor) 40 mg tablet Take 1 tablet (40 mg) by mouth once daily. 90 tablet 3     semaglutide 0.25 mg or 0.5 mg (2 mg/3 mL) pen injector Inject 0.5 mg under the skin 1 (one) time per week. 3 mL 11     No current facility-administered medications for this visit.     Problem list:  Patient Active Problem List   Diagnosis    Abnormal stress test    Acne vulgaris    Acquired absence of other organs    Alcohol abuse, in remission    Allergic rhinitis    Chronic mastoiditis    Chronic rhinitis    Chronic sinusitis    Angina pectoris (CMS-HCC)    Scapular dyskinesis    Antalgic gait    Arthralgia of right temporomandibular joint    Temporomandibular joint click    TMJ pain dysfunction syndrome    Atherosclerosis of coronary artery    Carpal tunnel syndrome, bilateral    Cervical spondylosis without myelopathy    Cholesteatoma, left    Chondromalacia of left patella    Chronic otitis media of both ears    Chronic ankle pain    Chronic pain    Colon polyp    Conductive hearing loss, bilateral    Coronary artery calcification seen on CT scan    CSF leak    Decreased range of motion of left ankle    Disorder of vocal cords    Dyslipidemia    ETD (Eustachian tube dysfunction), bilateral    Gastrointestinal hemorrhage    Generalized anxiety disorder    GERD (gastroesophageal reflux disease)    Granuloma of skin    Other bursal cyst, unspecified hand    History of stroke with residual effects    Immune deficiency disorder (Multi)    Impaired strength of lower extremity    Impetigo    Incipient senile cataract    Ankle instability    Ischemic optic neuropathy, right    Optic atrophy of right eye    Melanocytic nevi of trunk    Melanocytic nevi of unspecified upper limb, including shoulder    Melanocytic nevi of other parts of face    Migraine with visual aura    Mixed hearing loss of left ear    Class 3 severe obesity with serious comorbidity and body mass index (BMI) of 40.0 to 44.9 in adult (Multi)    Myopia    Myringotomy tube(s) status    Neoplasm of uncertain behavior of skin    Neuritis    MARIFER  (obstructive sleep apnea)    Otalgia, right ear    Other seborrheic keratosis    Personal history of colonic polyps    Pes cavus    PND (paroxysmal nocturnal dyspnea)    Pyogenic granuloma    Segmental and somatic dysfunction    Skin changes due to chronic exposure to nonionizing radiation, unspecified    Subjective tinnitus of left ear    Tonsil asymmetry    Trigeminal neuropathy    VMR (vasomotor rhinitis)    Dyspnea on exertion    Strain of rotator cuff of left shoulder    Mild major depression (CMS-HCC)    Type 2 diabetes mellitus without complication, without long-term current use of insulin (Multi)    Annual physical exam     Physical Examination:  CONSTITUTIONAL:  No acute distress  VOICE:  No hoarseness or other abnormality  RESPIRATION:  Breathing comfortably, no stridor  CV:  No clubbing/cyanosis/edema in hands  EYES:  EOM intact, sclera clear  NEURO:  Alert and oriented times 3, Cranial nerves II-XII grossly intact and symmetric bilaterally  HEAD AND FACE:  Symmetric facial features, no masses or lesions  RIGHT EAR:   tympanostomy tube has extruded and is in the ear canal. This was removed using micro instrumentation. Normal external ear and post auricular area, no visible lesions, external auditory canal patent, tympanic membrane intact, no retraction, no signs of mass, effusion, or infection within the middle ear  LEFT EAR: cavity is clear, no effusion. Normal external ear and post auricular area, no visible lesions, external auditory canal patent, tympanic membrane intact, no retraction, no signs of mass, effusion, or infection within the middle ear  NOSE:  Anterior rhinoscopy clear, no significant external deformity.  ORAL CAVITY/OROPHARYNX/LIPS:  normal lining, mobile tongue.  PHARYNGEAL WALLS: Moist mucosal lining, good palatal elevation  NECK/LYMPH:  No LAD, no thyroid masses, trachea midline  SKIN:  Neck and facial skin is without scar or injury  PSYCH:  Alert and oriented with appropriate mood  and affect    Impression:  Bilateral chronic otitis media  Left mixed hearing loss  Bilateral Chronic ETD    Recommendation:  Follow-up as needed.   I discussed with the patient the complexity of my medical decision making including the treatment and testing rational, indications of their elective procedure and possible adverse effects and/or complications. Based on the provided documentation and my professional assessment of this patient's chronic conditions, the complexity of evaluation and treatment is low.    This note was created using speech recognition transcription software. Despite proofreading, several typographical errors might be present that might affect the meaning of the content. Please call with any questions.      Scribe Attestation  By signing my name below, I, Carrie Marroquin , Scribe attest that this documentation has been prepared under the direction and in the presence of Marquez Marin MD.

## 2024-06-21 ENCOUNTER — APPOINTMENT (OUTPATIENT)
Dept: OTOLARYNGOLOGY | Facility: CLINIC | Age: 54
End: 2024-06-21
Payer: COMMERCIAL

## 2024-06-21 DIAGNOSIS — H69.93 ETD (EUSTACHIAN TUBE DYSFUNCTION), BILATERAL: Primary | ICD-10-CM

## 2024-06-21 PROCEDURE — 3008F BODY MASS INDEX DOCD: CPT | Performed by: OTOLARYNGOLOGY

## 2024-06-21 PROCEDURE — 1036F TOBACCO NON-USER: CPT | Performed by: OTOLARYNGOLOGY

## 2024-06-21 PROCEDURE — 3048F LDL-C <100 MG/DL: CPT | Performed by: OTOLARYNGOLOGY

## 2024-06-21 PROCEDURE — 3044F HG A1C LEVEL LT 7.0%: CPT | Performed by: OTOLARYNGOLOGY

## 2024-06-21 PROCEDURE — 99213 OFFICE O/P EST LOW 20 MIN: CPT | Performed by: OTOLARYNGOLOGY

## 2024-06-21 RX ORDER — FLUTICASONE PROPIONATE 50 MCG
2 SPRAY, SUSPENSION (ML) NASAL DAILY
Qty: 16 G | Refills: 11 | Status: SHIPPED | OUTPATIENT
Start: 2024-06-21 | End: 2025-06-21

## 2024-06-21 RX ORDER — AMOXICILLIN AND CLAVULANATE POTASSIUM 875; 125 MG/1; MG/1
1 TABLET, FILM COATED ORAL
COMMUNITY
Start: 2024-06-12

## 2024-06-21 RX ORDER — METHYLPREDNISOLONE 4 MG/1
TABLET ORAL
Qty: 21 TABLET | Refills: 0 | Status: SHIPPED | OUTPATIENT
Start: 2024-06-21

## 2024-06-21 ASSESSMENT — PATIENT HEALTH QUESTIONNAIRE - PHQ9
SUM OF ALL RESPONSES TO PHQ9 QUESTIONS 1 AND 2: 6
2. FEELING DOWN, DEPRESSED OR HOPELESS: NEARLY EVERY DAY
10. IF YOU CHECKED OFF ANY PROBLEMS, HOW DIFFICULT HAVE THESE PROBLEMS MADE IT FOR YOU TO DO YOUR WORK, TAKE CARE OF THINGS AT HOME, OR GET ALONG WITH OTHER PEOPLE: SOMEWHAT DIFFICULT
1. LITTLE INTEREST OR PLEASURE IN DOING THINGS: NEARLY EVERY DAY

## 2024-06-26 ENCOUNTER — APPOINTMENT (OUTPATIENT)
Dept: INTEGRATIVE MEDICINE | Facility: CLINIC | Age: 54
End: 2024-06-26
Payer: COMMERCIAL

## 2024-06-26 DIAGNOSIS — M26.629 TMJ PAIN DYSFUNCTION SYNDROME: ICD-10-CM

## 2024-06-26 DIAGNOSIS — M99.00 SEGMENTAL DYSFUNCTION OF HEAD REGION: ICD-10-CM

## 2024-06-26 DIAGNOSIS — M99.02 SEGMENTAL AND SOMATIC DYSFUNCTION OF THORACIC REGION: ICD-10-CM

## 2024-06-26 DIAGNOSIS — M99.01 SEGMENTAL DYSFUNCTION OF CERVICAL REGION: Primary | ICD-10-CM

## 2024-06-26 DIAGNOSIS — M47.812 CERVICAL SPONDYLOSIS WITHOUT MYELOPATHY: ICD-10-CM

## 2024-06-26 DIAGNOSIS — G25.89 SCAPULAR DYSKINESIS: ICD-10-CM

## 2024-06-26 PROCEDURE — 98941 CHIROPRACT MANJ 3-4 REGIONS: CPT | Performed by: CHIROPRACTOR

## 2024-06-26 NOTE — PROGRESS NOTES
Subjective   Patient ID: Benjamín Sahu is a 54 y.o. male who presents June 26, 2024 for neck, upper back and jaw pain.    (7/15) [facility]    Today, the patient rates their degree of pain as a 6 out of 10 on the numeric pain rating scale.     HPI - Benjamín returns to my office for chiropractic care with main complaints of neck and jaw pain. He found the last treatment helpful for his jaw. However, left-sided jaw pain persists after a tooth extraction from the left upper quadrant. Notes additional left ear pain and pressure. Scheduled with orthodontist in August. Denies new trauma/incident.    No other changes in health reported.       Objective   Physical Exam  Neurological:      General: No focal deficit present.      Mental Status: He is alert and oriented to person, place, and time.      Cranial Nerves: No dysarthria or facial asymmetry.      Sensory: Sensation is intact.      Motor: Motor function is intact.      Coordination: Coordination is intact.      Gait: Gait is intact.         Palpation of the following region(s) revealed:  Cervical: Suboccipitals bilateral, hypertonicity and tenderness.  Temporalis right, muscular hypertonicity.  SCM right, hypertonicity and tenderness.  Upper trapezius bilateral, hypertonicity and tenderness.  Cervical paraspinals bilateral, hypertonicity and tenderness.  Muscles of mastication right, hypertonicity and tenderness.  Thoracic: Middle trapezius bilateral, muscular hypertonicity.  Rhomboids bilateral, muscular hypertonicity.  Pectoralis bilateral, muscular hypertonicity.        Segmental Joint(s): Segmental joint dysfunction was assessed with motion palpation and is identified in the following areas:  Cervical : C0 C1 C5  Thoracic : T2., T3, T5, and T6      Assessment/Plan   Today's Treatment Included: Chiropractic manipulation to the Segmental Joint(s) Cervical : C0 C1 C5  Segmental Joint(s) Thoracic : T2., T3, T5, and T6   Treatment Techniques Used : Activator/Tool assisted  technique, Manual traction, and Low force  Integrative Dry Needling (IDN) - Needles in / out:  6.  IDN: BL masseter, BL upper trap, C7-T1 PS    Supine activator to jaw - BL    Soft-tissue mobilization was performed in the following areas:   Suboccipital bilateral, Cervical paraspinal mm. bilateral, SCM bilateral, Upper Trapezius bilateral, Temporalis bilateral, and Masseter bilateral  Levator Scap. bilateral, Rhomboids bilateral, and Pectoralis bilateral      F/U in 2 weeks or as-needed    The patient tolerated today's treatment with little or no additional discomfort and was instructed to contact the office for questions or concerns.

## 2024-07-08 ENCOUNTER — HOSPITAL ENCOUNTER (OUTPATIENT)
Dept: RADIOLOGY | Facility: EXTERNAL LOCATION | Age: 54
Discharge: HOME | End: 2024-07-08
Payer: COMMERCIAL

## 2024-07-08 DIAGNOSIS — S89.92XA LEFT KNEE INJURY, INITIAL ENCOUNTER: ICD-10-CM

## 2024-07-15 ENCOUNTER — OFFICE VISIT (OUTPATIENT)
Dept: ORTHOPEDIC SURGERY | Facility: HOSPITAL | Age: 54
End: 2024-07-15
Payer: COMMERCIAL

## 2024-07-15 ENCOUNTER — TELEPHONE (OUTPATIENT)
Dept: ORTHOPEDIC SURGERY | Facility: CLINIC | Age: 54
End: 2024-07-15

## 2024-07-15 ENCOUNTER — HOSPITAL ENCOUNTER (OUTPATIENT)
Dept: RADIOLOGY | Facility: EXTERNAL LOCATION | Age: 54
Discharge: HOME | End: 2024-07-15

## 2024-07-15 ENCOUNTER — APPOINTMENT (OUTPATIENT)
Dept: INTEGRATIVE MEDICINE | Facility: CLINIC | Age: 54
End: 2024-07-15
Payer: COMMERCIAL

## 2024-07-15 VITALS — WEIGHT: 279 LBS | BODY MASS INDEX: 41.32 KG/M2 | HEIGHT: 69 IN

## 2024-07-15 DIAGNOSIS — M17.11 LOCALIZED OSTEOARTHRITIS OF RIGHT KNEE: Primary | ICD-10-CM

## 2024-07-15 DIAGNOSIS — M25.561 RIGHT KNEE PAIN, UNSPECIFIED CHRONICITY: ICD-10-CM

## 2024-07-15 DIAGNOSIS — M25.561 ACUTE PAIN OF RIGHT KNEE: ICD-10-CM

## 2024-07-15 PROCEDURE — 3044F HG A1C LEVEL LT 7.0%: CPT | Performed by: FAMILY MEDICINE

## 2024-07-15 PROCEDURE — 1036F TOBACCO NON-USER: CPT | Performed by: FAMILY MEDICINE

## 2024-07-15 PROCEDURE — 99204 OFFICE O/P NEW MOD 45 MIN: CPT | Performed by: FAMILY MEDICINE

## 2024-07-15 PROCEDURE — 3008F BODY MASS INDEX DOCD: CPT | Performed by: FAMILY MEDICINE

## 2024-07-15 PROCEDURE — 99214 OFFICE O/P EST MOD 30 MIN: CPT | Performed by: FAMILY MEDICINE

## 2024-07-15 PROCEDURE — 3048F LDL-C <100 MG/DL: CPT | Performed by: FAMILY MEDICINE

## 2024-07-15 ASSESSMENT — PAIN SCALES - GENERAL: PAINLEVEL_OUTOF10: 5 - MODERATE PAIN

## 2024-07-15 ASSESSMENT — PAIN - FUNCTIONAL ASSESSMENT: PAIN_FUNCTIONAL_ASSESSMENT: 0-10
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
## 2024-07-22 ENCOUNTER — APPOINTMENT (OUTPATIENT)
Dept: INTEGRATIVE MEDICINE | Facility: CLINIC | Age: 54
End: 2024-07-22
Payer: COMMERCIAL

## 2024-07-22 DIAGNOSIS — M99.01 SEGMENTAL DYSFUNCTION OF CERVICAL REGION: Primary | ICD-10-CM

## 2024-07-22 DIAGNOSIS — M47.812 CERVICAL SPONDYLOSIS WITHOUT MYELOPATHY: ICD-10-CM

## 2024-07-22 DIAGNOSIS — M99.02 SEGMENTAL AND SOMATIC DYSFUNCTION OF THORACIC REGION: ICD-10-CM

## 2024-07-22 DIAGNOSIS — M99.00 SEGMENTAL DYSFUNCTION OF HEAD REGION: ICD-10-CM

## 2024-07-22 DIAGNOSIS — M26.629 TMJ PAIN DYSFUNCTION SYNDROME: ICD-10-CM

## 2024-07-22 DIAGNOSIS — G25.89 SCAPULAR DYSKINESIS: ICD-10-CM

## 2024-07-22 PROCEDURE — 98941 CHIROPRACT MANJ 3-4 REGIONS: CPT | Performed by: CHIROPRACTOR
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
## 2024-08-05 ENCOUNTER — APPOINTMENT (OUTPATIENT)
Dept: INTEGRATIVE MEDICINE | Facility: CLINIC | Age: 54
End: 2024-08-05
Payer: COMMERCIAL

## 2024-08-05 DIAGNOSIS — M99.00 SEGMENTAL DYSFUNCTION OF HEAD REGION: ICD-10-CM

## 2024-08-05 DIAGNOSIS — M26.629 TMJ PAIN DYSFUNCTION SYNDROME: ICD-10-CM

## 2024-08-05 DIAGNOSIS — M47.812 CERVICAL SPONDYLOSIS WITHOUT MYELOPATHY: ICD-10-CM

## 2024-08-05 DIAGNOSIS — M99.02 SEGMENTAL AND SOMATIC DYSFUNCTION OF THORACIC REGION: ICD-10-CM

## 2024-08-05 DIAGNOSIS — M99.01 SEGMENTAL DYSFUNCTION OF CERVICAL REGION: Primary | ICD-10-CM

## 2024-08-05 PROCEDURE — 98941 CHIROPRACT MANJ 3-4 REGIONS: CPT | Performed by: CHIROPRACTOR

## 2024-08-05 NOTE — PROGRESS NOTES
Subjective   Patient ID: Benjamín Sahu is a 54 y.o. male who presents August 5, 2024 for neck, upper back and jaw pain.    (9/15) VPCY    Today, the patient rates their degree of pain as a 6 out of 10 on the numeric pain rating scale.     HPI - Benjamín returns to my office for chiropractic care with main complaints of neck and jaw pain. He reports continued jaw pain and left ear pressure secondary to a tooth extraction on his left that is causing an altered bite pattern. He wakes up feeling a lot of pressure in his ear. He also reports feeling tight in his neck and upper back. He has found treatments helpful in providing some temporary relief but plans to reach out to his dentist for treatment options after recent extraction. Denies new trauma/incident.    No other changes in health reported.       Objective   Physical Exam  Neurological:      General: No focal deficit present.      Mental Status: He is alert and oriented to person, place, and time.      Cranial Nerves: No dysarthria or facial asymmetry.      Sensory: Sensation is intact.      Motor: Motor function is intact.      Coordination: Coordination is intact.      Gait: Gait is intact.         Palpation of the following region(s) revealed:  Cervical: Suboccipitals bilateral, hypertonicity and tenderness.  Temporalis right, muscular hypertonicity.  SCM right, hypertonicity and tenderness.  Upper trapezius bilateral, hypertonicity and tenderness.  Cervical paraspinals bilateral, hypertonicity and tenderness.  Muscles of mastication right, hypertonicity and tenderness.  Thoracic: Middle trapezius bilateral, muscular hypertonicity.  Rhomboids bilateral, muscular hypertonicity.  Pectoralis bilateral, muscular hypertonicity.        Segmental Joint(s): Segmental joint dysfunction was assessed with motion palpation and is identified in the following areas:  Cervical : C0 C1 C5  Thoracic : T2., T3, T5, and T6      Assessment/Plan   Today's Treatment Included:  Chiropractic manipulation to the Segmental Joint(s) Cervical : C0 C1 C5  Segmental Joint(s) Thoracic : T2., T3, T5, and T6   Treatment Techniques Used : Activator/Tool assisted technique, Manual traction, and Other: prone PA mobs  Integrative Dry Needling (IDN) - Needles in / out:  6.  IDN: BL masseter, suboccipitals and upper trap    Supine masseter release and activator to jaw - BL    Soft-tissue mobilization was performed in the following areas:   Suboccipital bilateral, Cervical paraspinal mm. bilateral, SCM bilateral, Upper Trapezius bilateral, Temporalis bilateral, and Masseter bilateral  Levator Scap. bilateral, Rhomboids bilateral, and Pectoralis bilateral    F/U in 2 weeks or as-needed    The patient tolerated today's treatment with little or no additional discomfort and was instructed to contact the office for questions or concerns.

## 2024-08-26 ENCOUNTER — APPOINTMENT (OUTPATIENT)
Dept: INTEGRATIVE MEDICINE | Facility: CLINIC | Age: 54
End: 2024-08-26
Payer: COMMERCIAL

## 2024-08-26 DIAGNOSIS — M47.812 CERVICAL SPONDYLOSIS WITHOUT MYELOPATHY: ICD-10-CM

## 2024-08-26 DIAGNOSIS — G25.89 SCAPULAR DYSKINESIS: ICD-10-CM

## 2024-08-26 DIAGNOSIS — M99.00 SEGMENTAL DYSFUNCTION OF HEAD REGION: ICD-10-CM

## 2024-08-26 DIAGNOSIS — M26.629 TMJ PAIN DYSFUNCTION SYNDROME: ICD-10-CM

## 2024-08-26 DIAGNOSIS — M99.01 SEGMENTAL DYSFUNCTION OF CERVICAL REGION: Primary | ICD-10-CM

## 2024-08-26 DIAGNOSIS — M99.02 SEGMENTAL AND SOMATIC DYSFUNCTION OF THORACIC REGION: ICD-10-CM

## 2024-08-26 PROCEDURE — 98941 CHIROPRACT MANJ 3-4 REGIONS: CPT | Performed by: CHIROPRACTOR

## 2024-08-26 NOTE — PROGRESS NOTES
Subjective   Patient ID: Benjamín Sahu is a 54 y.o. male who presents August 26, 2024 for neck, upper back and jaw pain.    (10/15) VPCY    Today, the patient rates their degree of pain as a 7 out of 10 on the numeric pain rating scale.     HPI - Benjamín returns to my office for chiropractic care with main complaints of neck and jaw pain. He had another tooth extracted on Friday, August 16th. Notes experiencing left-sided jaw and ear pain after this. Also states that he mowed his grass last week with flair of sinus pressure and drainage after. Reports he is being fitted for partials tomorrow with his dentist and will F/U on pain. Denies new trauma/incident.    No other changes in health reported.       Objective   Physical Exam  Neurological:      General: No focal deficit present.      Mental Status: He is alert and oriented to person, place, and time.      Cranial Nerves: No dysarthria or facial asymmetry.      Sensory: Sensation is intact.      Motor: Motor function is intact.      Coordination: Coordination is intact.      Gait: Gait is intact.         Palpation of the following region(s) revealed:  Cervical: Suboccipitals bilateral, hypertonicity and tenderness.  Temporalis right, muscular hypertonicity.  SCM right, hypertonicity and tenderness.  Upper trapezius bilateral, hypertonicity and tenderness.  Cervical paraspinals bilateral, hypertonicity and tenderness.  Muscles of mastication right, hypertonicity and tenderness.  Thoracic: Middle trapezius bilateral, muscular hypertonicity.  Rhomboids bilateral, muscular hypertonicity.  Pectoralis bilateral, muscular hypertonicity.        Segmental Joint(s): Segmental joint dysfunction was assessed with motion palpation and is identified in the following areas:  Cervical : C0 C1 C5  Thoracic : T2., T3, T5, and T6      Assessment/Plan   Today's Treatment Included: Chiropractic manipulation to the Segmental Joint(s) Cervical : C0 C1 C5  Segmental Joint(s) Thoracic : T2.,  T3, T5, and T6   Treatment Techniques Used : Activator/Tool assisted technique, Manual traction, and Other: prone PA mobs  Integrative Dry Needling (IDN) - Needles in / out:  6.  IDN: BL masseter, suboccipitals and upper trap    Supine masseter release and activator to jaw - BL    Soft-tissue mobilization was performed in the following areas:   Suboccipital bilateral, Cervical paraspinal mm. bilateral, SCM bilateral, Upper Trapezius bilateral, Temporalis bilateral, and Masseter bilateral  Levator Scap. bilateral, Rhomboids bilateral, and Pectoralis bilateral    F/U in 2 weeks or as-needed    The patient tolerated today's treatment with little or no additional discomfort and was instructed to contact the office for questions or concerns.

## 2024-09-10 ENCOUNTER — APPOINTMENT (OUTPATIENT)
Dept: INTEGRATIVE MEDICINE | Facility: CLINIC | Age: 54
End: 2024-09-10
Payer: COMMERCIAL

## 2024-09-10 DIAGNOSIS — M99.02 SEGMENTAL AND SOMATIC DYSFUNCTION OF THORACIC REGION: ICD-10-CM

## 2024-09-10 DIAGNOSIS — M99.00 SEGMENTAL DYSFUNCTION OF HEAD REGION: ICD-10-CM

## 2024-09-10 DIAGNOSIS — M99.01 SEGMENTAL DYSFUNCTION OF CERVICAL REGION: Primary | ICD-10-CM

## 2024-09-10 DIAGNOSIS — M26.629 TMJ PAIN DYSFUNCTION SYNDROME: ICD-10-CM

## 2024-09-10 DIAGNOSIS — M47.812 CERVICAL SPONDYLOSIS WITHOUT MYELOPATHY: ICD-10-CM

## 2024-09-10 PROCEDURE — 98941 CHIROPRACT MANJ 3-4 REGIONS: CPT | Performed by: CHIROPRACTOR

## 2024-09-10 NOTE — PROGRESS NOTES
Subjective   Patient ID: Benjamín Sahu is a 54 y.o. male who presents September 10, 2024 for neck, upper back and jaw pain.    (11/15) VPCY    Today, the patient rates their degree of pain as a 6 out of 10 on the numeric pain rating scale.     HPI - Benjamín returns to my office for chiropractic care with main complaints of neck and jaw pain. He reports that he is now in the 4-step process of having partials put in after left-sided tooth extractions. He notes tension along the left side of the jaw and popping in the ear. Notes tension into the neck and shoulders. No radicular complaints reported. Denies new trauma/incident.    No other changes in health reported.       Objective   Physical Exam  Neurological:      General: No focal deficit present.      Mental Status: He is alert and oriented to person, place, and time.      Cranial Nerves: No dysarthria or facial asymmetry.      Sensory: Sensation is intact.      Motor: Motor function is intact.      Coordination: Coordination is intact.      Gait: Gait is intact.         Palpation of the following region(s) revealed:  Cervical: Suboccipitals bilateral, hypertonicity and tenderness.  Temporalis right, muscular hypertonicity.  SCM right, hypertonicity and tenderness.  Upper trapezius bilateral, hypertonicity and tenderness.  Cervical paraspinals bilateral, hypertonicity and tenderness.  Muscles of mastication right, hypertonicity and tenderness.  Thoracic: Middle trapezius bilateral, muscular hypertonicity.  Rhomboids bilateral, muscular hypertonicity.  Pectoralis bilateral, muscular hypertonicity.        Segmental Joint(s): Segmental joint dysfunction was assessed with motion palpation and is identified in the following areas:  Cervical : C0 C1 C5  Thoracic : T2., T3, T5, and T6      Assessment/Plan   Today's Treatment Included: Chiropractic manipulation to the Segmental Joint(s) Cervical : C0 C1 C5  Segmental Joint(s) Thoracic : T2., T3, T5, and T6   Treatment  Techniques Used : Activator/Tool assisted technique, Manual traction, and Other: prone PA mobs  Integrative Dry Needling (IDN) - Needles in / out:  4.  IDN:suboccipitals and upper trap    Supine ART and PNF masseter release - BL    Soft-tissue mobilization was performed in the following areas:   Suboccipital bilateral, Cervical paraspinal mm. bilateral, SCM bilateral, Upper Trapezius bilateral, Temporalis bilateral, and Masseter bilateral  Levator Scap. bilateral, Rhomboids bilateral, and Pectoralis bilateral    F/U in 2 weeks or as-needed    The patient tolerated today's treatment with little or no additional discomfort and was instructed to contact the office for questions or concerns.

## 2024-09-20 ENCOUNTER — APPOINTMENT (OUTPATIENT)
Dept: OTOLARYNGOLOGY | Facility: CLINIC | Age: 54
End: 2024-09-20
Payer: COMMERCIAL

## 2024-09-20 VITALS — BODY MASS INDEX: 42.21 KG/M2 | WEIGHT: 285 LBS | HEIGHT: 69 IN

## 2024-09-20 DIAGNOSIS — H95.192 ENCOUNTER FOR MASTOIDECTOMY CAVITY DEBRIDEMENT, LEFT: ICD-10-CM

## 2024-09-20 DIAGNOSIS — H69.93 ETD (EUSTACHIAN TUBE DYSFUNCTION), BILATERAL: ICD-10-CM

## 2024-09-20 DIAGNOSIS — H66.93 CHRONIC OTITIS MEDIA OF BOTH EARS: Primary | ICD-10-CM

## 2024-09-20 PROCEDURE — 3044F HG A1C LEVEL LT 7.0%: CPT | Performed by: OTOLARYNGOLOGY

## 2024-09-20 PROCEDURE — 3008F BODY MASS INDEX DOCD: CPT | Performed by: OTOLARYNGOLOGY

## 2024-09-20 PROCEDURE — 99213 OFFICE O/P EST LOW 20 MIN: CPT | Performed by: OTOLARYNGOLOGY

## 2024-09-20 PROCEDURE — 69220 CLEAN OUT MASTOID CAVITY: CPT | Performed by: OTOLARYNGOLOGY

## 2024-09-20 PROCEDURE — 1036F TOBACCO NON-USER: CPT | Performed by: OTOLARYNGOLOGY

## 2024-09-20 PROCEDURE — 3048F LDL-C <100 MG/DL: CPT | Performed by: OTOLARYNGOLOGY

## 2024-09-20 RX ORDER — BUPROPION HYDROCHLORIDE 150 MG/1
150 TABLET ORAL
COMMUNITY
Start: 2024-09-11

## 2024-09-20 ASSESSMENT — PATIENT HEALTH QUESTIONNAIRE - PHQ9
1. LITTLE INTEREST OR PLEASURE IN DOING THINGS: SEVERAL DAYS
2. FEELING DOWN, DEPRESSED OR HOPELESS: SEVERAL DAYS
SUM OF ALL RESPONSES TO PHQ9 QUESTIONS 1 AND 2: 2
10. IF YOU CHECKED OFF ANY PROBLEMS, HOW DIFFICULT HAVE THESE PROBLEMS MADE IT FOR YOU TO DO YOUR WORK, TAKE CARE OF THINGS AT HOME, OR GET ALONG WITH OTHER PEOPLE: SOMEWHAT DIFFICULT

## 2024-09-20 NOTE — PROGRESS NOTES
History of present illness:  Benjamín Sahu 54 y.o. male  presenting today for follow-up. He reports a sensation of popping in the left ear, particularly when drinking.     RECALL 6/21/24:   Benjamín Sahu is a 54 y.o. male presenting today for follow-up.      Patient's right ear is causing him some discomfort, itching and pain.      RECALL 2/5/24  Benjamín Sahu is a 54 y.o. male presenting today for follow-up.  Overall he is doing well. He continues to complain of intermittent or occasional popping sensation in his ears. He denies any drainage. Hearing seems to be stable.     The patient's current medications, active allergies and list of medical problems were reviewed in the EHR and confirmed electronically there are as follows;  Allergies:   Allergies   Allergen Reactions    Ragweed Itching     Other reaction(s): Other: See Comments   sinus     Current list of medications:   Current Outpatient Medications   Medication Sig Dispense Refill    amoxicillin-pot clavulanate (Augmentin) 875-125 mg tablet Take 1 tablet by mouth every 12 hours.      aspirin 81 mg EC tablet Take 1 tablet (81 mg) by mouth once daily. 90 tablet 3    blood sugar diagnostic (Blood Glucose Test) strip Check fasting glucose at least once a week, and 2 hours after a meal at least once daily. Dx: DM-2 (E11.9).      clindamycin (Cleocin T) 1 % lotion Apply topically.      escitalopram (Lexapro) 20 mg tablet PLEASE SEE ATTACHED FOR DETAILED DIRECTIONS      fluticasone (Flonase) 50 mcg/actuation nasal spray Administer 1 spray into each nostril 2 times a day. Shake gently. Before first use, prime pump. After use, clean tip and replace cap. 48 g 11    fluticasone (Flonase) 50 mcg/actuation nasal spray Administer 2 sprays into each nostril once daily. Shake gently. Before first use, prime pump. After use, clean tip and replace cap. 16 g 11    ketoconazole (NIZOral) 2 % cream Apply topically 2 times a day.      melatonin 5 mg tablet Take 1 tablet (5 mg) by  mouth once daily at bedtime.      methylPREDNISolone (Medrol Dospak) 4 mg tablets Follow schedule on package instructions 21 tablet 0    nitroglycerin (Nitrostat) 0.4 mg SL tablet 1 tablet (0.4 mg).      pantoprazole (ProtoNix) 20 mg EC tablet Take 1 tablet (20 mg) by mouth.      Pataday Once Daily Relief 0.2 % ophthalmic solution       Rexulti 1 mg tablet TAKE 1 MG BY MOUTH DAILY INDICATIONS: ADDITIONAL TREATMENT FOR MAJOR DEPRESSIVE DISORDER      rosuvastatin (Crestor) 40 mg tablet Take 1 tablet (40 mg) by mouth once daily. 90 tablet 3    semaglutide 0.25 mg or 0.5 mg (2 mg/3 mL) pen injector Inject 0.5 mg under the skin 1 (one) time per week. 3 mL 11     No current facility-administered medications for this visit.     Problem list:  Patient Active Problem List   Diagnosis    Abnormal stress test    Acne vulgaris    Acquired absence of other organs    Alcohol abuse, in remission    Allergic rhinitis    Chronic mastoiditis    Chronic rhinitis    Chronic sinusitis    Angina pectoris (CMS-HCC)    Scapular dyskinesis    Antalgic gait    Arthralgia of right temporomandibular joint    Temporomandibular joint click    TMJ pain dysfunction syndrome    Atherosclerosis of coronary artery    Carpal tunnel syndrome, bilateral    Cervical spondylosis without myelopathy    Cholesteatoma, left    Chondromalacia of left patella    Chronic otitis media of both ears    Chronic ankle pain    Chronic pain    Colon polyp    Conductive hearing loss, bilateral    Coronary artery calcification seen on CT scan    CSF leak    Decreased range of motion of left ankle    Disorder of vocal cords    Dyslipidemia    ETD (Eustachian tube dysfunction), bilateral    Gastrointestinal hemorrhage    Generalized anxiety disorder    GERD (gastroesophageal reflux disease)    Granuloma of skin    Other bursal cyst, unspecified hand    History of stroke with residual effects    Immune deficiency disorder (Multi)    Impaired strength of lower extremity     Impetigo    Incipient senile cataract    Ankle instability    Ischemic optic neuropathy, right    Optic atrophy of right eye    Melanocytic nevi of trunk    Melanocytic nevi of unspecified upper limb, including shoulder    Melanocytic nevi of other parts of face    Migraine with visual aura    Mixed hearing loss of left ear    Class 3 severe obesity with serious comorbidity and body mass index (BMI) of 40.0 to 44.9 in adult (Multi)    Myopia    Myringotomy tube(s) status    Neoplasm of uncertain behavior of skin    Neuritis    MARIFER (obstructive sleep apnea)    Otalgia, right ear    Other seborrheic keratosis    Personal history of colonic polyps    Pes cavus    PND (paroxysmal nocturnal dyspnea)    Pyogenic granuloma    Segmental and somatic dysfunction    Skin changes due to chronic exposure to nonionizing radiation, unspecified    Subjective tinnitus of left ear    Tonsil asymmetry    Trigeminal neuropathy    VMR (vasomotor rhinitis)    Dyspnea on exertion    Strain of rotator cuff of left shoulder    Mild major depression (CMS-HCC)    Type 2 diabetes mellitus without complication, without long-term current use of insulin (Multi)    Annual physical exam         Physical Examination:  CONSTITUTIONAL:  No acute distress  VOICE:  No hoarseness or other abnormality  RESPIRATION:  Breathing comfortably, no stridor  CV:  No clubbing/cyanosis/edema in hands  EYES:  EOM intact, sclera clear  NEURO:  Alert and oriented times 3, Cranial nerves II-XII grossly intact and symmetric bilaterally  HEAD AND FACE:  Symmetric facial features, no masses or lesions  RIGHT EAR: Normal TM, no retraction, no effusion. Normal external ear and post auricular area, no visible lesions, external auditory canal patent, tympanic membrane intact, no retraction, no signs of mass, effusion, or infection within the middle ear  LEFT EAR: Ear cavity debrided and cleaned using otomicroscope. Large amount of debris removed. Grafts intact. Cartilage  grafting in good placement. Normal external ear and post auricular area, no visible lesions, external auditory canal patent, tympanic membrane intact, no retraction, no signs of mass, effusion, or infection within the middle ear  NOSE:  Anterior rhinoscopy clear, no significant external deformity.  ORAL CAVITY/OROPHARYNX/LIPS:  normal lining, mobile tongue.  PHARYNGEAL WALLS: Moist mucosal lining, good palatal elevation  NECK/LYMPH:  No LAD, no thyroid masses, trachea midline  SKIN:  Neck and facial skin is without scar or injury  PSYCH:  Alert and oriented with appropriate mood and affect      Impression:  Bilateral chronic otitis media  Left mixed hearing loss  Bilateral Chronic ETD  History of CSF leak, resolved       Recommendation:  Follow-up with Mila Poon in 4 months for cavity cleaning. I will see him on an as needed basis. He is in agreement.     I discussed with the patient the complexity of my medical decision making including the treatment and testing rational, indications of their elective procedure and possible adverse effects and/or complications. Based on the provided documentation and my professional assessment of this patient's [acute condition/acute exacerbation of their chronic condition/newly diagnosed condition/progression of their condition/complicated course of their medical condition], the complexity of evaluation and treatment is [low-moderate-high].    This note was created using speech recognition transcription software. Despite proofreading, several typographical errors might be present that might affect the meaning of the content. Please call with any questions.      Scribe Attestation  By signing my name below, Carrie UMANZOR , Scribe attest that this documentation has been prepared under the direction and in the presence of Marquez Marin MD.

## 2024-10-01 ENCOUNTER — APPOINTMENT (OUTPATIENT)
Dept: INTEGRATIVE MEDICINE | Facility: CLINIC | Age: 54
End: 2024-10-01
Payer: COMMERCIAL

## 2024-10-01 DIAGNOSIS — M26.629 TMJ PAIN DYSFUNCTION SYNDROME: ICD-10-CM

## 2024-10-01 DIAGNOSIS — M99.02 SEGMENTAL AND SOMATIC DYSFUNCTION OF THORACIC REGION: ICD-10-CM

## 2024-10-01 DIAGNOSIS — M47.812 CERVICAL SPONDYLOSIS WITHOUT MYELOPATHY: ICD-10-CM

## 2024-10-01 DIAGNOSIS — M99.00 SEGMENTAL DYSFUNCTION OF HEAD REGION: ICD-10-CM

## 2024-10-01 DIAGNOSIS — M99.01 SEGMENTAL DYSFUNCTION OF CERVICAL REGION: Primary | ICD-10-CM

## 2024-10-01 PROCEDURE — 98941 CHIROPRACT MANJ 3-4 REGIONS: CPT | Performed by: CHIROPRACTOR

## 2024-10-01 NOTE — PROGRESS NOTES
Subjective   Patient ID: Benjamín Sahu is a 54 y.o. male who presents October 1, 2024 for neck, upper back and jaw pain.    (12/15) VPCY    Today, the patient rates their degree of pain as a 6 out of 10 on the numeric pain rating scale.     HPI - Benjamín returns to my office for chiropractic care with main complaints of neck and jaw pain. He reports he has been experiencing increased neck pain as of recent that he attributes to poor sleeping posture and quality of sleep. He finds his head tilting off to the right side and has been noticing tension along the left > right side of the neck into the upper trapezius region. The mold was taken to create partials for his lower teeth and he is awaiting their arrival, which should be in the next few weeks. Notes left-sided jaw tension.  Denies new trauma/incident.    No other changes in health reported.       Objective   Physical Exam  Neurological:      General: No focal deficit present.      Mental Status: He is alert and oriented to person, place, and time.      Cranial Nerves: No dysarthria or facial asymmetry.      Sensory: Sensation is intact.      Motor: Motor function is intact.      Coordination: Coordination is intact.      Gait: Gait is intact.         Palpation of the following region(s) revealed:  Cervical: Suboccipitals bilateral, hypertonicity and tenderness.  Temporalis right, muscular hypertonicity.  SCM right, hypertonicity and tenderness.  Upper trapezius bilateral, hypertonicity and tenderness.  Cervical paraspinals bilateral, hypertonicity and tenderness.  Muscles of mastication right, hypertonicity and tenderness.  Thoracic: Middle trapezius bilateral, muscular hypertonicity.  Rhomboids bilateral, muscular hypertonicity.  Pectoralis bilateral, muscular hypertonicity.        Segmental Joint(s): Segmental joint dysfunction was assessed with motion palpation and is identified in the following areas:  Cervical : C0 C1 C5  Thoracic : T2., T3, T5, and  T6      Assessment/Plan   Today's Treatment Included: Chiropractic manipulation to the Segmental Joint(s) Cervical : C0 C1 C5  Segmental Joint(s) Thoracic : T2., T3, T5, and T6   Treatment Techniques Used : Activator/Tool assisted technique, Manual traction, and Other: prone PA mobs  Integrative Dry Needling (IDN) - Needles in / out:  6.  IDN: BL suboccipitals, C5 PS and BL upper trap    Supine ART temporalis and masseter release - BL    Soft-tissue mobilization was performed in the following areas:   Suboccipital bilateral, Cervical paraspinal mm. bilateral, SCM bilateral, Upper Trapezius bilateral, Temporalis bilateral, and Masseter bilateral  Levator Scap. bilateral, Rhomboids bilateral, and Pectoralis bilateral    F/U in 3 weeks or as-needed    The patient tolerated today's treatment with little or no additional discomfort and was instructed to contact the office for questions or concerns.

## 2024-10-03 ENCOUNTER — OFFICE VISIT (OUTPATIENT)
Dept: URGENT CARE | Age: 54
End: 2024-10-03
Payer: COMMERCIAL

## 2024-10-03 VITALS
HEART RATE: 67 BPM | TEMPERATURE: 98.8 F | RESPIRATION RATE: 18 BRPM | OXYGEN SATURATION: 95 % | SYSTOLIC BLOOD PRESSURE: 131 MMHG | DIASTOLIC BLOOD PRESSURE: 75 MMHG

## 2024-10-03 DIAGNOSIS — R09.81 NASAL CONGESTION: Primary | ICD-10-CM

## 2024-10-03 DIAGNOSIS — J06.9 VIRAL UPPER RESPIRATORY TRACT INFECTION: ICD-10-CM

## 2024-10-03 LAB
POC BINAX EXPIRATION: NORMAL
POC BINAX NOW COVID SERIAL NUMBER: NORMAL
POC SARS-COV-2 AG BINAX: NORMAL

## 2024-10-03 ASSESSMENT — ENCOUNTER SYMPTOMS: SINUS PRESSURE: 1

## 2024-10-03 NOTE — LETTER
October 3, 2024     Patient: Benjamín Sahu   YOB: 1970   Date of Visit: 10/3/2024       To Whom It May Concern:    Benjamín Sahu was seen in my clinic on 10/3/2024 at 2:15 pm. Please excuse Benjamín for his absence from work on this day to make the appointment.    If you have any questions or concerns, please don't hesitate to call.         Sincerely,         NOLAN Mak-CNP        CC: No Recipients
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
## 2024-10-03 NOTE — PATIENT INSTRUCTIONS
You were seen at Urgent Care today for upper respiratory symptoms.  Your COVID was negative.  Please treat as discussed.  Monitor for red flags which we spoke about, If your symptoms change, worsen or become concerning in any way, please go to the emergency room immediately, otherwise you can followup with your PCP in 2-3 days as needed

## 2024-11-04 ENCOUNTER — APPOINTMENT (OUTPATIENT)
Dept: INTEGRATIVE MEDICINE | Facility: CLINIC | Age: 54
End: 2024-11-04
Payer: COMMERCIAL

## 2024-11-25 ENCOUNTER — APPOINTMENT (OUTPATIENT)
Dept: ORTHOPEDIC SURGERY | Facility: CLINIC | Age: 54
End: 2024-11-25
Payer: COMMERCIAL

## 2024-11-25 ENCOUNTER — HOSPITAL ENCOUNTER (OUTPATIENT)
Dept: RADIOLOGY | Facility: CLINIC | Age: 54
Discharge: HOME | End: 2024-11-25
Payer: COMMERCIAL

## 2024-11-25 VITALS — HEIGHT: 69 IN | BODY MASS INDEX: 42.21 KG/M2 | WEIGHT: 285 LBS

## 2024-11-25 DIAGNOSIS — M67.814 TENDINOSIS OF LEFT ROTATOR CUFF: Primary | ICD-10-CM

## 2024-11-25 DIAGNOSIS — M75.22 BICEPS TENDINITIS OF LEFT SHOULDER: ICD-10-CM

## 2024-11-25 DIAGNOSIS — M25.812 IMPINGEMENT OF LEFT SHOULDER: ICD-10-CM

## 2024-11-25 DIAGNOSIS — M25.512 LEFT SHOULDER PAIN, UNSPECIFIED CHRONICITY: ICD-10-CM

## 2024-11-25 PROCEDURE — 3008F BODY MASS INDEX DOCD: CPT

## 2024-11-25 PROCEDURE — 3048F LDL-C <100 MG/DL: CPT

## 2024-11-25 PROCEDURE — 1036F TOBACCO NON-USER: CPT

## 2024-11-25 PROCEDURE — 73030 X-RAY EXAM OF SHOULDER: CPT | Mod: LEFT SIDE

## 2024-11-25 PROCEDURE — 99213 OFFICE O/P EST LOW 20 MIN: CPT

## 2024-11-25 PROCEDURE — 20610 DRAIN/INJ JOINT/BURSA W/O US: CPT

## 2024-11-25 PROCEDURE — 3044F HG A1C LEVEL LT 7.0%: CPT

## 2024-11-25 PROCEDURE — 73030 X-RAY EXAM OF SHOULDER: CPT | Mod: LT

## 2024-11-25 RX ORDER — LIDOCAINE HYDROCHLORIDE 10 MG/ML
2 INJECTION, SOLUTION INFILTRATION; PERINEURAL
Status: COMPLETED | OUTPATIENT
Start: 2024-11-25 | End: 2024-11-25

## 2024-11-25 RX ORDER — TRIAMCINOLONE ACETONIDE 40 MG/ML
40 INJECTION, SUSPENSION INTRA-ARTICULAR; INTRAMUSCULAR
Status: COMPLETED | OUTPATIENT
Start: 2024-11-25 | End: 2024-11-25

## 2024-11-25 ASSESSMENT — PAIN SCALES - GENERAL: PAINLEVEL_OUTOF10: 6

## 2024-11-25 ASSESSMENT — PAIN - FUNCTIONAL ASSESSMENT: PAIN_FUNCTIONAL_ASSESSMENT: 0-10
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
## 2024-11-25 NOTE — LETTER
November 25, 2024     Patient: Benjamín Sahu   YOB: 1970   Date of Visit: 11/25/2024       To Whom it May Concern:    Benjamín Sahu was seen in my clinic on 11/25/2024. Any questions or concerns please call us at [phone]          Sincerely,          Gabrielle LoPresti, PA-C        CC: No Recipients

## 2024-11-26 ENCOUNTER — TELEPHONE (OUTPATIENT)
Dept: ORTHOPEDIC SURGERY | Facility: CLINIC | Age: 54
End: 2024-11-26
Payer: COMMERCIAL

## 2024-11-26 NOTE — TELEPHONE ENCOUNTER
Benjamín Sahu called in and left a voicemail asking if he can participate in a new crossfit class or if you think he should avoid that at this time.     He was seen yesterday for left shoulder rotator cuff tendinosis and impingement.

## 2024-12-12 ENCOUNTER — APPOINTMENT (OUTPATIENT)
Dept: ORTHOPEDIC SURGERY | Facility: HOSPITAL | Age: 54
End: 2024-12-12
Payer: COMMERCIAL

## 2024-12-17 ENCOUNTER — OFFICE VISIT (OUTPATIENT)
Dept: URGENT CARE | Age: 54
End: 2024-12-17
Payer: COMMERCIAL

## 2024-12-17 VITALS
SYSTOLIC BLOOD PRESSURE: 157 MMHG | TEMPERATURE: 98.2 F | OXYGEN SATURATION: 96 % | HEART RATE: 61 BPM | DIASTOLIC BLOOD PRESSURE: 96 MMHG

## 2024-12-17 DIAGNOSIS — L84 CALLUS OF FOOT: Primary | ICD-10-CM

## 2024-12-17 PROCEDURE — 3077F SYST BP >= 140 MM HG: CPT

## 2024-12-17 PROCEDURE — 3080F DIAST BP >= 90 MM HG: CPT

## 2024-12-17 PROCEDURE — 99212 OFFICE O/P EST SF 10 MIN: CPT

## 2024-12-17 PROCEDURE — 3044F HG A1C LEVEL LT 7.0%: CPT

## 2024-12-17 PROCEDURE — 3048F LDL-C <100 MG/DL: CPT
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
## 2024-12-17 NOTE — PATIENT INSTRUCTIONS
You were seen at Urgent Care today and diagnosed with a callus. Please treat as discussed. Monitor for red flags which we spoke about, If your symptoms change, worsen or become concerning in any way, please go to the emergency room immediately, otherwise you can followup with your PCP in 2-3 days as needed

## 2024-12-18 ENCOUNTER — APPOINTMENT (OUTPATIENT)
Dept: INTEGRATIVE MEDICINE | Facility: CLINIC | Age: 54
End: 2024-12-18
Payer: COMMERCIAL

## 2024-12-18 DIAGNOSIS — M99.02 SEGMENTAL AND SOMATIC DYSFUNCTION OF THORACIC REGION: ICD-10-CM

## 2024-12-18 DIAGNOSIS — M99.01 SEGMENTAL DYSFUNCTION OF CERVICAL REGION: Primary | ICD-10-CM

## 2024-12-18 DIAGNOSIS — M99.00 SEGMENTAL DYSFUNCTION OF HEAD REGION: ICD-10-CM

## 2024-12-18 DIAGNOSIS — M47.812 CERVICAL SPONDYLOSIS WITHOUT MYELOPATHY: ICD-10-CM

## 2024-12-18 DIAGNOSIS — M26.629 TMJ PAIN DYSFUNCTION SYNDROME: ICD-10-CM

## 2024-12-18 PROCEDURE — 98941 CHIROPRACT MANJ 3-4 REGIONS: CPT | Performed by: CHIROPRACTOR

## 2024-12-18 NOTE — PROGRESS NOTES
Subjective   Patient ID: Benjamín Sahu is a 54 y.o. male who presents December 18, 2024 for chiropractic care.    (13/15) [facility]    Today, the patient rates their degree of pain as a 4 out of 10 on the numeric pain rating scale.     Benjamín returns for continued treatment of neck/upper back and shoulder pain. Patient states that he had an injection in his left shoulder for pain and he has not noted any improvement in symptoms from the injection. He states that neck/upper back pain is about the same. She states that he has increased anxiety and stress from the holidays. Denies any associated symptoms or change in medical history since last visit and will follow up in 2-3 weeks.       Objective   Physical Exam  Neurological:      General: No focal deficit present.      Mental Status: He is alert and oriented to person, place, and time.      Cranial Nerves: No dysarthria or facial asymmetry.      Sensory: Sensation is intact.      Motor: Motor function is intact.      Coordination: Coordination is intact.      Gait: Gait is intact.       Palpation of the following region(s) revealed:  Cervical: Scalenes bilateral, muscular hypertonicity.  Upper trapezius bilateral, muscular hypertonicity.  Levator scapulae bilateral, muscular hypertonicity.  Cervical paraspinals bilateral, muscular hypertonicity.  Thoracic: Thoracic paraspinals bilateral, muscular hypertonicity.  Middle trapezius bilateral, muscular hypertonicity.  Rhomboids bilateral, muscular hypertonicity.    Segmental Joint(s): Segmental joint dysfunction was assessed with motion palpation and is identified in the following areas:  Cervical : C2 C4 C5  Thoracic : T2., T4, and T5    Assessment/Plan   Today's Treatment Included: Chiropractic manipulation to the Segmental Joint(s) Cervical : C2 C5 C6  Segmental Joint(s) Thoracic : T2., T4, and T5   Treatment Techniques Used : Manual traction and Low force  Pin and stretch  Integrative Dry Needling (IDN) - Needles in / out:   12. CS paraspinals, suboccipitals bilateral.    Soft-tissue mobilization was performed in the following areas:   Cervical paraspinal mm. bilateral, Scalenes bilateral, Upper Trapezius bilateral, and Levator Scap. bilateral  Thoracic Paraspinal mm. bilateral, Middle Trapezius bilateral, and Rhomboids bilateral    The patient tolerated today's treatment with little or no additional discomfort and was instructed to contact the office for questions or concerns.

## 2025-01-02 ENCOUNTER — APPOINTMENT (OUTPATIENT)
Dept: INTEGRATIVE MEDICINE | Facility: CLINIC | Age: 55
End: 2025-01-02
Payer: COMMERCIAL

## 2025-01-08 ENCOUNTER — APPOINTMENT (OUTPATIENT)
Dept: OTOLARYNGOLOGY | Facility: CLINIC | Age: 55
End: 2025-01-08
Payer: COMMERCIAL

## 2025-02-11 ENCOUNTER — OFFICE VISIT (OUTPATIENT)
Dept: ORTHOPEDIC SURGERY | Facility: HOSPITAL | Age: 55
End: 2025-02-11
Payer: COMMERCIAL

## 2025-02-11 ENCOUNTER — APPOINTMENT (OUTPATIENT)
Dept: ORTHOPEDIC SURGERY | Facility: HOSPITAL | Age: 55
End: 2025-02-11
Payer: COMMERCIAL

## 2025-02-11 DIAGNOSIS — M75.102 TEAR OF LEFT ROTATOR CUFF, UNSPECIFIED TEAR EXTENT, UNSPECIFIED WHETHER TRAUMATIC: ICD-10-CM

## 2025-02-11 PROCEDURE — 99214 OFFICE O/P EST MOD 30 MIN: CPT

## 2025-02-11 PROCEDURE — 1036F TOBACCO NON-USER: CPT
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
## 2025-02-12 ENCOUNTER — APPOINTMENT (OUTPATIENT)
Dept: OTOLARYNGOLOGY | Facility: CLINIC | Age: 55
End: 2025-02-12
Payer: COMMERCIAL

## 2025-02-12 VITALS — WEIGHT: 285 LBS | BODY MASS INDEX: 42.09 KG/M2

## 2025-02-12 DIAGNOSIS — H69.93 ETD (EUSTACHIAN TUBE DYSFUNCTION), BILATERAL: ICD-10-CM

## 2025-02-12 DIAGNOSIS — H71.92 CHOLESTEATOMA, LEFT: ICD-10-CM

## 2025-02-12 DIAGNOSIS — H61.23 BILATERAL IMPACTED CERUMEN: Primary | ICD-10-CM

## 2025-02-12 PROCEDURE — 99213 OFFICE O/P EST LOW 20 MIN: CPT | Performed by: NURSE PRACTITIONER

## 2025-02-12 PROCEDURE — 1036F TOBACCO NON-USER: CPT | Performed by: NURSE PRACTITIONER

## 2025-02-13 PROCEDURE — 69210 REMOVE IMPACTED EAR WAX UNI: CPT | Performed by: NURSE PRACTITIONER

## 2025-02-13 NOTE — PROGRESS NOTES
Subjective   Patient ID: Benjamín Sahu is a 55 y.o. male who presents for Follow-up.  HPI  Patient presents for scheduled ear cleaning.  In review, he has a history of bilateral COM, left cholesteatoma, left CSF leak, bilateral eustachian tube dysfunction.  Today, he denies any new otologic complaints.  He reports intermittent bilateral ear popping usually caused by weather changes.    Review of Systems  A comprehensive or 10 points review of the patient´s constitutional, neurological, HEENT, pulmonary, cardiovascular and genito-urinary systems showed only those mentioned in history of present illness.    Objective   Physical Exam  Constitutional: no fever, chills, weight loss or weight gain   General appearance: Appears well, well-nourished, well groomed. No acute distress.   Communication: Normal communication   Psychiatric: Oriented to person, place and time. Normal mood and affect.   Neurologic: Cranial nerves II-XII grossly intact and symmetric bilaterally.   Head and Face:   Head: Atraumatic with no masses, lesions or scarring.   Face: Normal symmetry, no paralysis, synkinesis or facial tic. No scars or deformities.     Eyes: Conjunctiva not edematous or erythematous   Ears: External inspection of ears with no deformity, scars or masses.  Bilateral canals with cerumen impactions.  Neck: Normal appearing, symmetric, trachea midline.   Cardiovascular: Examination of peripheral vascular system shows no clubbing or cyanosis.   Respiratory: No respiratory distress increased work of breathing. Inspection of the chest with symmetric chest expansion and normal respiratory effort.   Skin: No rashes in the head or neck    Assessment/Plan     This patient presents for subsequent evaluation of acute acquired bilateral cerumen impaction as well as chronic left cholesteatoma and bilateral eustachian tube dysfunction.     He may follow-up with me as needed.  All questions were answered to patient's satisfaction.    This note  was created using speech recognition transcription software. Despite proofreading, several typographical errors might be present that might affect the meaning of the content. Please call with any questions.  Patient ID: Benjamín Sahu is a 55 y.o. male.    Ear cerumen removal    Date/Time: 2/13/2025 2:33 PM    Performed by: SANDY Ray  Authorized by: SANDY Ray    Consent:     Consent obtained:  Verbal    Consent given by:  Patient    Risks discussed:  Pain    Alternatives discussed:  No treatment  Procedure details:     Location:  L ear and R ear    Procedure type: curette      Procedure outcomes: cerumen removed    Post-procedure details:     Inspection:  No bleeding and ear canal clear    Hearing quality:  Normal    Procedure completion:  Tolerated well, no immediate complications  Comments:      Right TM intact.  Previous PE tube has been removed and the TM is well-healed.  On the left moderate amount of dry cerumen removed from the lateral EAC.  Cartilage graft intact.   Anterior portion slightly retracted with clear fluid which is improved from previous exam.      SANDY Ray 02/13/25 2:32 PM

## 2025-03-24 ENCOUNTER — TELEPHONE (OUTPATIENT)
Dept: ORTHOPEDIC SURGERY | Facility: HOSPITAL | Age: 55
End: 2025-03-24
Payer: COMMERCIAL

## 2025-03-24 NOTE — TELEPHONE ENCOUNTER
Benjamín called states that the place he was going for his MRI is giving him the run around and would like for it to instead be scheduled through . He requests a call back to discuss times and date.

## 2025-04-01 ENCOUNTER — OFFICE VISIT (OUTPATIENT)
Dept: CARDIOLOGY | Facility: CLINIC | Age: 55
End: 2025-04-01
Payer: COMMERCIAL

## 2025-04-01 VITALS
WEIGHT: 300 LBS | HEIGHT: 70 IN | BODY MASS INDEX: 42.95 KG/M2 | HEART RATE: 61 BPM | OXYGEN SATURATION: 95 % | SYSTOLIC BLOOD PRESSURE: 139 MMHG | DIASTOLIC BLOOD PRESSURE: 81 MMHG

## 2025-04-01 DIAGNOSIS — I25.10 CORONARY ARTERY DISEASE INVOLVING NATIVE CORONARY ARTERY OF NATIVE HEART WITHOUT ANGINA PECTORIS: Primary | ICD-10-CM

## 2025-04-01 DIAGNOSIS — R07.89 OTHER CHEST PAIN: ICD-10-CM

## 2025-04-01 DIAGNOSIS — R06.09 DYSPNEA ON EXERTION: ICD-10-CM

## 2025-04-01 DIAGNOSIS — I25.119 CORONARY ARTERY DISEASE INVOLVING NATIVE CORONARY ARTERY OF NATIVE HEART WITH ANGINA PECTORIS: ICD-10-CM

## 2025-04-01 PROBLEM — I20.9 ANGINA PECTORIS: Status: RESOLVED | Noted: 2023-09-27 | Resolved: 2025-04-01

## 2025-04-01 PROCEDURE — 93005 ELECTROCARDIOGRAM TRACING: CPT | Performed by: INTERNAL MEDICINE

## 2025-04-01 PROCEDURE — 1036F TOBACCO NON-USER: CPT | Performed by: INTERNAL MEDICINE

## 2025-04-01 PROCEDURE — 3008F BODY MASS INDEX DOCD: CPT | Performed by: INTERNAL MEDICINE

## 2025-04-01 PROCEDURE — 99214 OFFICE O/P EST MOD 30 MIN: CPT | Performed by: INTERNAL MEDICINE

## 2025-04-01 PROCEDURE — 3079F DIAST BP 80-89 MM HG: CPT | Performed by: INTERNAL MEDICINE

## 2025-04-01 PROCEDURE — 3075F SYST BP GE 130 - 139MM HG: CPT | Performed by: INTERNAL MEDICINE

## 2025-04-01 RX ORDER — NITROGLYCERIN 0.4 MG/1
0.4 TABLET SUBLINGUAL EVERY 5 MIN PRN
Qty: 25 TABLET | Refills: 1 | Status: SHIPPED | OUTPATIENT
Start: 2025-04-01

## 2025-04-01 RX ORDER — ROSUVASTATIN CALCIUM 40 MG/1
40 TABLET, COATED ORAL DAILY
Qty: 90 TABLET | Refills: 3 | Status: SHIPPED | OUTPATIENT
Start: 2025-04-01

## 2025-04-01 RX ORDER — ASPIRIN 81 MG/1
81 TABLET ORAL DAILY
COMMUNITY

## 2025-04-01 RX ORDER — SEMAGLUTIDE 0.25 MG/.5ML
0.25 INJECTION, SOLUTION SUBCUTANEOUS
Qty: 6 ML | Refills: 3 | Status: SHIPPED | OUTPATIENT
Start: 2025-04-01 | End: 2026-04-01

## 2025-04-01 ASSESSMENT — ENCOUNTER SYMPTOMS
FEVER: 0
VOMITING: 0
HEMOPTYSIS: 0
DYSURIA: 0
MYALGIAS: 0
DEPRESSION: 0
ALTERED MENTAL STATUS: 0
ABDOMINAL PAIN: 0
FALLS: 0
HEADACHES: 0
WHEEZING: 0
MEMORY LOSS: 0
CHILLS: 0
OCCASIONAL FEELINGS OF UNSTEADINESS: 1
COUGH: 0
LOSS OF SENSATION IN FEET: 1
CONSTIPATION: 0
BLOATING: 0
DEPRESSION: 1
DIARRHEA: 0
HEMATURIA: 0
NAUSEA: 0

## 2025-04-01 ASSESSMENT — PAIN SCALES - GENERAL: PAINLEVEL_OUTOF10: 7

## 2025-04-01 NOTE — PROGRESS NOTES
Chief Complaint   Patient presents with    Coronary Artery Disease       HPI  56 yo WM w/ h/o CAD, bord HLO, GERD, mild MARIFER (intol CPAP), TOB (cigars, 20 yr, quit '09) now here for cardiology f/u. He is having L upper chest/shoulder/shoulder blade pain worse with raising arm with occ hand tingling. No other chest pain.  No dyspnea at rest. +occ CARMONA (mod exertion). No orthopnea/PND. No palps. No LH/dizziness/syncope. No edema. No claudication. No cough. +occ fatigue. +snoring.  He occ walks with no symptoms.  ECG 9/16: SB (54)  ECG 11/16: SB (56)  ECG 4/20: SB (53)  ECG 9/21: SB (59), nonsp ST changes  ECG 3/24: SR (79), normal  ECG 3/24: SR (79), normal  ECG 4/25: SR (61), normal  HM 9/21: SR, HR  (avg 67), SVT x12 (long 11b)  Echo 7/16: EF 55-60%, up nl LA size  Echo 1/18: EF 60-65%  24h HM 7/16: SR, HR  (avg 69)  ALEC 5/10: no ischemia  ETT 11/16: dyspnea, 1mm DS ST depression  Cath 3/11: LM ok, pLAD 30%, p-mLAD 50%, mLAD 40%, p-mLCx 40%, RCA ok, EF 55%, no MR, no AI, no AS, nl Ao  Cath 3/17: LM ok, pLAD 30%, p-mLAD 50%, mLAD 40%, p-mLCx 40%, RCA ok, EF 55%, no AS/AI, no MR  CXR 7/22: no acute abnl  CXR 1/24: no acute abnl  CTA chest 11/16: no PE, nl Ao, coronary calcium, slight mosaic atten of lungs may be related to RAD  Sleep study 3/19: mild MARIFER  MRI brain 6/19: no acute abnl  Carotid US 7/16: no HDSS     Review of Systems   Constitutional: Negative for chills, fever and malaise/fatigue.   HENT:  Negative for hearing loss.    Eyes:  Negative for visual disturbance.   Respiratory:  Negative for cough, hemoptysis and wheezing.    Skin:  Negative for rash.   Musculoskeletal:  Negative for falls and myalgias.   Gastrointestinal:  Negative for bloating, abdominal pain, constipation, diarrhea, dysphagia, nausea and vomiting.   Genitourinary:  Negative for dysuria and hematuria.   Neurological:  Negative for headaches.   Psychiatric/Behavioral:  Negative for altered mental status, depression and memory  loss.       Social History     Tobacco Use    Smoking status: Former     Types: Cigarettes     Passive exposure: Never    Smokeless tobacco: Never   Substance Use Topics    Alcohol use: Yes     Alcohol/week: 3.0 standard drinks of alcohol     Types: 3 Shots of liquor per week      Family History   Problem Relation Name Age of Onset    Other (cardiac disorder) Father      Other (hypertension) Father      Heart attack Father      Other (Cardiac defibrilator) Father      Hypertension Father's Brother      Hypertension Paternal Grandmother      Hypertension Paternal Grandfather        Allergies   Allergen Reactions    Ragweed Itching     Other reaction(s): Other: See Comments   sinus      Current Outpatient Medications   Medication Instructions    aspirin 81 mg, Daily    blood sugar diagnostic (Blood Glucose Test) strip Check fasting glucose at least once a week, and 2 hours after a meal at least once daily. Dx: DM-2 (E11.9).    clindamycin (Cleocin T) 1 % lotion Apply topically.    escitalopram (Lexapro) 20 mg tablet PLEASE SEE ATTACHED FOR DETAILED DIRECTIONS    fluticasone (Flonase) 50 mcg/actuation nasal spray 1 spray, Each Nostril, 2 times daily, Shake gently. Before first use, prime pump. After use, clean tip and replace cap.    fluticasone (Flonase) 50 mcg/actuation nasal spray 2 sprays, Each Nostril, Daily, Shake gently. Before first use, prime pump. After use, clean tip and replace cap.    melatonin 5 mg tablet 1 tablet, Nightly    nitroglycerin (NITROSTAT) 0.4 mg, sublingual, Every 5 min PRN    pantoprazole (PROTONIX) 20 mg    Pataday Once Daily Relief 0.2 % ophthalmic solution Administer 1 drop into both eyes once daily as needed for allergies.    rosuvastatin (CRESTOR) 40 mg, oral, Daily       Vitals:    04/01/25 1629   BP: 139/81   Pulse: 61   SpO2: 95%      Physical Exam  Constitutional:       Appearance: Normal appearance.   HENT:      Head: Normocephalic and atraumatic.      Nose: Nose normal.   Neck:       Vascular: No carotid bruit.   Cardiovascular:      Rate and Rhythm: Normal rate and regular rhythm.      Heart sounds: No murmur heard.  Pulmonary:      Effort: Pulmonary effort is normal.      Breath sounds: Normal breath sounds.   Abdominal:      Palpations: Abdomen is soft.      Tenderness: There is no abdominal tenderness.   Musculoskeletal:      Right lower leg: No edema.      Left lower leg: No edema.   Skin:     General: Skin is warm and dry.   Neurological:      General: No focal deficit present.      Mental Status: He is alert.   Psychiatric:         Mood and Affect: Mood normal.         Judgment: Judgment normal.        Results/Data  3/24 Cr 0.79, K 4.0, LFT nl, HGB 16.3, , HSTPN 5  1/24 Cr 0.8, K 4.0, LFT nl, LDL 80, HDL 47, , Chol 147  12/21 hgba1c 6.8  7/21 Cr 0.74, K 4.2, LFT nl, LDL 69, HDL 35, TG 89, Chol 122, HGB 15.4, , hgba1c 6.8  3/20 Cr 0.95, K 4.3, LFT nl, hgba1c 6.5  2/20 CRP 0.3  6/19 Cr 0.78, K 4.1, LFT nl, LDL 69, HDL 38, TG 81, Chol 123, HGB 15.1, , hgba1c 6.2, TSH 1.45  3/19 Cr 0.69, K 4.0, Mg 1.9, LFT nl, LDL 58, HDL 37, TG 70, Chol 109, HGB 16, , hgba1c 6.3, TSH 1.2  3/18 Cr 0.69, K 4.3, HGB 16,   12/17 Cr 0.8, K 4.3, LFT nl, LDL 48, HDL 49, TG 98, Chol 117, HGB 16.4, , hgba1c 6.3  3/17 Cr 0.75, K 4.0, LFT nl, LDL 94, HDL 36, , Chol 175, HGB 15.2,   11/16 Cr 0.91, K 4.2, HGB 14.8, , TPN neg, BNP 30  7/16 Cr 0.83, K 4.2, LFT nl, , HDL 42, , CHol 181, hgba1c 6.0     Assessment/Plan   54 yo WM w/ h/o CAD, bord HPL, mild MARIFER (intol CPAP), GERD, TOB (cigars, 20 yr, quit '09) now w/ L upper chest/back pain worse with moving arm c/w MSK etiology, ?rotator cuff. He is getting MRI later this week. If MRI negative and/or becomes more typical, consider stress test.  ECG today normal.  Cath 3/17 with at most 50% stenosis.  He sleeps with wedge pillow to keep him on his side (for snoring/mild MARIFER).  -continue ASA 81  qd  -continue Rosuva 40 qhs   -add Wegovy (CAD)  -f/u 1 year (earlier if needed)     Marques Gilmore MD

## 2025-04-04 LAB
ATRIAL RATE: 61 BPM
P AXIS: 49 DEGREES
P OFFSET: 178 MS
P ONSET: 124 MS
PR INTERVAL: 192 MS
Q ONSET: 220 MS
QRS COUNT: 10 BEATS
QRS DURATION: 80 MS
QT INTERVAL: 378 MS
QTC CALCULATION(BAZETT): 380 MS
QTC FREDERICIA: 379 MS
R AXIS: 36 DEGREES
T AXIS: 67 DEGREES
T OFFSET: 409 MS
VENTRICULAR RATE: 61 BPM

## 2025-04-08 ENCOUNTER — HOSPITAL ENCOUNTER (OUTPATIENT)
Dept: RADIOLOGY | Facility: CLINIC | Age: 55
Discharge: HOME | End: 2025-04-08
Payer: COMMERCIAL

## 2025-04-08 DIAGNOSIS — M75.102 TEAR OF LEFT ROTATOR CUFF, UNSPECIFIED TEAR EXTENT, UNSPECIFIED WHETHER TRAUMATIC: ICD-10-CM

## 2025-04-08 PROCEDURE — 73221 MRI JOINT UPR EXTREM W/O DYE: CPT | Mod: LT

## 2025-04-08 PROCEDURE — 73221 MRI JOINT UPR EXTREM W/O DYE: CPT | Mod: LEFT SIDE | Performed by: RADIOLOGY

## 2025-04-11 ENCOUNTER — HOSPITAL ENCOUNTER (OUTPATIENT)
Dept: RADIOLOGY | Facility: CLINIC | Age: 55
Discharge: HOME | End: 2025-04-11
Payer: COMMERCIAL

## 2025-04-16 ENCOUNTER — APPOINTMENT (OUTPATIENT)
Dept: ORTHOPEDIC SURGERY | Age: 55
End: 2025-04-16
Payer: COMMERCIAL

## 2025-04-30 ENCOUNTER — APPOINTMENT (OUTPATIENT)
Dept: ORTHOPEDIC SURGERY | Age: 55
End: 2025-04-30
Payer: COMMERCIAL

## 2025-05-02 ENCOUNTER — OFFICE VISIT (OUTPATIENT)
Dept: ORTHOPEDIC SURGERY | Facility: HOSPITAL | Age: 55
End: 2025-05-02
Payer: COMMERCIAL

## 2025-05-02 DIAGNOSIS — M75.102 TEAR OF LEFT ROTATOR CUFF, UNSPECIFIED TEAR EXTENT, UNSPECIFIED WHETHER TRAUMATIC: Primary | ICD-10-CM

## 2025-05-02 PROCEDURE — 99214 OFFICE O/P EST MOD 30 MIN: CPT | Performed by: ORTHOPAEDIC SURGERY

## 2025-05-02 NOTE — PROGRESS NOTES
Subjective   Patient ID: Benjamín Sahu is a 55 y.o. male    Chief Complaint: No chief complaint on file.       Last Surgery: No surgery found  Date of Last Surgery: No surgery found    HPI  Benjamín Sahu is a 55 y.o. left hand dominant male presenting for left shoulder pain     He is here today as he is having continued shoulder pain. He is struggling with sleep. He has had an injection that helped somewhat. He does do home exercises daily.     He has had a PRP injection 2 weeks ago. He still has popping and snapping but motion is less painful.       Objective   Patient is a well-developed, well-nourished male  in no acute distress.  Breathes with normal chest rises.  Pupils are round and symmetric today.  Awake, alert, and oriented x3.      Examination of the left shoulder today reveals the skin to be intact. There is no sign of any atrophy, lesions, or abrasions. Pain to palpation along the anterior acromion and greater tuberosity. Cervical lymphadenopathy examined, and this was negative. Patient had 5 out of 5 wrist flexion, extension, and thumb extension bilaterally. Sensation was intact to light touch to median, ulnar, radial axillary, and musculocutaneous nerves bilaterally. Positive radial pulse bilaterally. Range of motion of the left shoulder revealed 0-170° of forward elevation, 0-60° of external rotation, and internal rotation was to T-12. 4/5 Gerson's testing. +Hawkin's. 5/5 RER    Examination of the right shoulder today reveals the skin to be intact. There is no sign of any atrophy, lesions, or abrasions. There is no pain to palpation of the bony prominences. Cervical lymphadenopathy examined, and this was negative. Patient had 5 out of 5 wrist flexion, extension, and thumb extension, bilaterally. Sensation was intact to light touch to median, ulnar, radial, axillary, and musculocutaneous nerves bilaterally. Positive radial pulse bilaterally. Provocative maneuvers are negative today. Range of motion of the  right shoulder revealed 0-170° of forward elevation, 0-60° of external rotation, and internal rotation up to T-12.    Imaging:  MRI of the LEFT shoulder taken 04/08/25 obtained and personally interpreted by me show leading border tear of the supraspinatus.  Anterior and posterior cuff intact. Some early arthritis. Significant bursitis. Medial imaging not very clear but no signs of fatty infiltration or degeneration of the infraspinatus      X-rays of the LEFT shoulder taken 11/25/24 personally ordered and interpreted by me show early arthritis     Assessment/Plan   No diagnosis found.  Patient with small full thickness tear of the left rotator cuff, some early arthritis     At this time, we had a very long discussion with the patient regarding the diagnosis. Rotator cuff disease is a spectrum of disorders ranging from rotator cuff tendinitis to full-thickness rotator cuff tears. We know that some patients over the age of 50 will have symptomatic rotator cuff tears just due to overuse and general wear and tear. In general, most patients who come in with complaints such as these will get better with therapy and injections alone. The therapy should be performed 2-3 times a day and should take about 10-15 minutes to perform each time.      Patient is status post injury of their LEFT shoulder. Recent MRI reveals a small full thickness tear. Patient had a normal shoulder prior to injury. Through the most recent literature we know that early detection and treatment of rotator cuff tears have a better outcome and function long term. They have failed conservative management of anti-inflammatories, physical therapy and injections. The risks, benefits and alternatives of shoulder surgery were discussed at length. The risks of surgery are infection, dislocation, nerve injury, blood loss. The benefits are pain relief and restoration of function. The patient verbalize an understanding of the risks, benefits, alternatives and the  expected outcomes and wishes to proceed with elective shoulder surgery at this time. The rehabilitation after surgery was discussed at length today. Rehabilitation after rotator cuff repair lasting a total of 5-6 months after surgery. Lifting is slowly progressed. They will be in a sling for the first month. The following 2-3 months would work on range of motion. No strengthening until 3 months from surgery.     Patient requires lifting of over 5 pounds with their job, so they will have to either remain off work until restrictions are removed or go back with light duty work.    They will be scheduled for a pre operative follow up visit with KALLIE Pereyra. This will be out-patient surgery. They will be fitted for the post operative sling at that time. They will get updated blood work done within 30 days of surgical date. They will need to be seen and cleared by the Anesthesia team prior to surgery date. All patient's questions were answered today to their satisfaction and they are in agreement with the above plan. They know how to reach the office if there are any additional questions prior to surgery date.     PLAN for LEFT shoulder arthroscopic decompression, debridement, rotator cuff repair with patch implant augmentation and open biceps tenodesis     We discussed all of his options. He was given a handout of our at-home exercises and a handout covering surgery. This would be up to him to him for timing   Make decision pending how he does this summer  Prp injection a few weeks ago  He will do exercises and let us know   He has previously seen Dr. Garza and he is in great hands with either way he goes.   No orders of the defined types were placed in this encounter.        Follow up as needed    Scribe Attestation  By signing my name below, IMarlene Scribe   attest that this documentation has been prepared under the direction and in the presence of Son Lindo MD.

## 2025-05-12 ENCOUNTER — APPOINTMENT (OUTPATIENT)
Dept: INTEGRATIVE MEDICINE | Facility: CLINIC | Age: 55
End: 2025-05-12
Payer: COMMERCIAL

## 2025-05-12 DIAGNOSIS — G25.89 SCAPULAR DYSKINESIS: ICD-10-CM

## 2025-05-12 DIAGNOSIS — M99.02 SEGMENTAL AND SOMATIC DYSFUNCTION OF THORACIC REGION: ICD-10-CM

## 2025-05-12 DIAGNOSIS — M99.00 SEGMENTAL DYSFUNCTION OF HEAD REGION: ICD-10-CM

## 2025-05-12 DIAGNOSIS — M99.01 SEGMENTAL DYSFUNCTION OF CERVICAL REGION: Primary | ICD-10-CM

## 2025-05-12 DIAGNOSIS — M47.812 CERVICAL SPONDYLOSIS WITHOUT MYELOPATHY: ICD-10-CM

## 2025-05-12 LAB
NON-UH HIE A/G RATIO: 1
NON-UH HIE ALB: 3.4 G/DL (ref 3.4–5)
NON-UH HIE ALK PHOS: 76 UNIT/L (ref 45–117)
NON-UH HIE BASO COUNT: 0.07 X1000 (ref 0–0.2)
NON-UH HIE BASOS %: 0.8 %
NON-UH HIE BILIRUBIN, TOTAL: 0.4 MG/DL (ref 0.3–1.2)
NON-UH HIE BUN/CREAT RATIO: 14
NON-UH HIE BUN: 14 MG/DL (ref 9–23)
NON-UH HIE CALCIUM: 9.1 MG/DL (ref 8.7–10.4)
NON-UH HIE CALCULATED LDL CHOLESTEROL: 89 MG/DL (ref 60–130)
NON-UH HIE CALCULATED OSMOLALITY: 285 MOSM/KG (ref 275–295)
NON-UH HIE CHLORIDE: 106 MMOL/L (ref 98–107)
NON-UH HIE CHOLESTEROL: 161 MG/DL (ref 100–200)
NON-UH HIE CO2, VENOUS: 27 MMOL/L (ref 20–31)
NON-UH HIE CREATININE: 1 MG/DL (ref 0.6–1.1)
NON-UH HIE DIFF?: NORMAL
NON-UH HIE EOS COUNT: 0.22 X1000 (ref 0–0.5)
NON-UH HIE EOSIN %: 2.4 %
NON-UH HIE FOLATE: 9.5 NG/ML (ref 5.4–17.5)
NON-UH HIE GFR AA: >60
NON-UH HIE GLOBULIN: 3.6 G/DL
NON-UH HIE GLOMERULAR FILTRATION RATE: >60 ML/MIN/1.73M?
NON-UH HIE GLUCOSE: 189 MG/DL (ref 74–106)
NON-UH HIE GOT: 14 UNIT/L (ref 15–37)
NON-UH HIE GPT: 18 UNIT/L (ref 10–49)
NON-UH HIE HCT: 47.4 % (ref 41–52)
NON-UH HIE HDL CHOLESTEROL: 43 MG/DL (ref 40–60)
NON-UH HIE HGB A1C: 6.9 %
NON-UH HIE HGB: 16.2 G/DL (ref 13.5–17.5)
NON-UH HIE INSTR WBC: 9.5
NON-UH HIE K: 4 MMOL/L (ref 3.5–5.1)
NON-UH HIE LYMPH %: 22.4 %
NON-UH HIE LYMPH COUNT: 2.12 X1000 (ref 1.2–4.8)
NON-UH HIE MCH: 30.8 PG (ref 27–34)
NON-UH HIE MCHC: 34.2 G/DL (ref 32–37)
NON-UH HIE MCV: 90.1 FL (ref 80–100)
NON-UH HIE MONO %: 7.4 %
NON-UH HIE MONO COUNT: 0.7 X1000 (ref 0.1–1)
NON-UH HIE MPV: 8.1 FL (ref 7.4–10.4)
NON-UH HIE NA: 140 MMOL/L (ref 135–145)
NON-UH HIE NEUTROPHIL %: 67.1 %
NON-UH HIE NEUTROPHIL COUNT (ANC): 6.37 X1000 (ref 1.4–8.8)
NON-UH HIE NUCLEATED RBC: 0 /100WBC
NON-UH HIE PLATELET: 267 X10 (ref 150–450)
NON-UH HIE RBC: 5.26 X10 (ref 4.7–6.1)
NON-UH HIE RDW: 13.9 % (ref 11.5–14.5)
NON-UH HIE TOTAL CHOL/HDL CHOL RATIO: 3.7
NON-UH HIE TOTAL PROTEIN: 7 G/DL (ref 5.7–8.2)
NON-UH HIE TRIGLYCERIDES: 143 MG/DL (ref 30–150)
NON-UH HIE TSH: 0.71 UIU/ML (ref 0.55–4.78)
NON-UH HIE VITAMIN B12: 300 PG/ML (ref 211–911)
NON-UH HIE WBC: 9.5 X10 (ref 4.5–11)

## 2025-05-12 PROCEDURE — 98941 CHIROPRACT MANJ 3-4 REGIONS: CPT | Performed by: CHIROPRACTOR
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
## 2025-05-15 ENCOUNTER — APPOINTMENT (OUTPATIENT)
Dept: OTOLARYNGOLOGY | Facility: CLINIC | Age: 55
End: 2025-05-15
Payer: COMMERCIAL

## 2025-05-15 VITALS — HEIGHT: 69 IN | BODY MASS INDEX: 43.69 KG/M2 | WEIGHT: 295 LBS

## 2025-05-15 DIAGNOSIS — H61.22 IMPACTED CERUMEN OF LEFT EAR: ICD-10-CM

## 2025-05-15 DIAGNOSIS — H71.92 CHOLESTEATOMA OF LEFT EAR: ICD-10-CM

## 2025-05-15 DIAGNOSIS — H69.93 ETD (EUSTACHIAN TUBE DYSFUNCTION), BILATERAL: Primary | ICD-10-CM

## 2025-05-15 PROCEDURE — 69210 REMOVE IMPACTED EAR WAX UNI: CPT | Performed by: NURSE PRACTITIONER

## 2025-05-15 PROCEDURE — 3008F BODY MASS INDEX DOCD: CPT | Performed by: NURSE PRACTITIONER

## 2025-05-15 PROCEDURE — 1036F TOBACCO NON-USER: CPT | Performed by: NURSE PRACTITIONER

## 2025-05-15 PROCEDURE — 99213 OFFICE O/P EST LOW 20 MIN: CPT | Performed by: NURSE PRACTITIONER

## 2025-05-15 RX ORDER — CETIRIZINE HYDROCHLORIDE 10 MG/1
10 TABLET ORAL DAILY PRN
Qty: 30 TABLET | Refills: 11 | Status: SHIPPED | OUTPATIENT
Start: 2025-05-15 | End: 2026-05-15

## 2025-05-15 ASSESSMENT — PATIENT HEALTH QUESTIONNAIRE - PHQ9
2. FEELING DOWN, DEPRESSED OR HOPELESS: NOT AT ALL
SUM OF ALL RESPONSES TO PHQ9 QUESTIONS 1 AND 2: 0
1. LITTLE INTEREST OR PLEASURE IN DOING THINGS: NOT AT ALL

## 2025-05-15 NOTE — PROGRESS NOTES
Subjective   Patient ID: Benjamín Sahu Jr. is a 55 y.o. male who presents for Follow-up.  HPI  Patient presents for scheduled ear cleaning.  In review, he has a history of bilateral COM, left cholesteatoma, left CSF leak, bilateral eustachian tube dysfunction.  Today, he denies any new otologic complaints.  He reports intermittent bilateral ear popping currently bothering him due to seasonal allergies.    Review of Systems  A comprehensive or 10 points review of the patient´s constitutional, neurological, HEENT, pulmonary, cardiovascular and genito-urinary systems showed only those mentioned in history of present illness.    Objective   Physical Exam  Constitutional: no fever, chills, weight loss or weight gain   General appearance: Appears well, well-nourished, well groomed. No acute distress.   Communication: Normal communication   Psychiatric: Oriented to person, place and time. Normal mood and affect.   Neurologic: Cranial nerves II-XII grossly intact and symmetric bilaterally.   Head and Face:   Head: Atraumatic with no masses, lesions or scarring.   Face: Normal symmetry, no paralysis, synkinesis or facial tic. No scars or deformities.     Eyes: Conjunctiva not edematous or erythematous   Ears: External inspection of ears with no deformity, scars or masses.  Right canal clear.  TM intact.  No effusion or retraction noted.  Moderate inferior tympanosclerosis.  Left canal with cerumen impaction.    Neck: Normal appearing, symmetric, trachea midline.   Cardiovascular: Examination of peripheral vascular system shows no clubbing or cyanosis.   Respiratory: No respiratory distress increased work of breathing. Inspection of the chest with symmetric chest expansion and normal respiratory effort.   Skin: No rashes in the head or neck    Assessment/Plan     This patient presents for subsequent evaluation of acute acquired left-sided cerumen impaction as well as chronic left cholesteatoma and bilateral eustachian tube  dysfunction.    Patient is requesting a refill on Zyrtec-[id number].  I am happy to send this to his pharmacy.  He may follow-up with me as needed.  All questions were answered to patient's satisfaction.    This note was created using speech recognition transcription software. Despite proofreading, several typographical errors might be present that might affect the meaning of the content. Please call with any questions.  Patient ID: Bejnamín Sahu Jr. is a 55 y.o. male.    Ear cerumen removal    Date/Time: 5/15/2025 4:56 PM    Performed by: SANDY Ray  Authorized by: SANDY Ray    Consent:     Consent obtained:  Verbal    Consent given by:  Patient    Risks discussed:  Pain    Alternatives discussed:  No treatment  Procedure details:     Location:  L ear    Procedure type: curette      Procedure outcomes: cerumen removed    Post-procedure details:     Inspection:  No bleeding, ear canal clear and TM intact    Hearing quality:  Normal    Procedure completion:  Tolerated well, no immediate complications  Comments:      Cartilage graft intact.  Anterior portion slightly retracted with scant clear fluid which is stable from previous exam.    SANDY Ray 05/15/25 4:55 PM

## 2025-05-20 ENCOUNTER — APPOINTMENT (OUTPATIENT)
Dept: PULMONOLOGY | Facility: CLINIC | Age: 55
End: 2025-05-20
Payer: COMMERCIAL

## 2025-05-30 ENCOUNTER — APPOINTMENT (OUTPATIENT)
Dept: INTEGRATIVE MEDICINE | Facility: CLINIC | Age: 55
End: 2025-05-30
Payer: COMMERCIAL

## 2025-05-30 ENCOUNTER — OFFICE VISIT (OUTPATIENT)
Facility: CLINIC | Age: 55
End: 2025-05-30
Payer: COMMERCIAL

## 2025-05-30 VITALS
DIASTOLIC BLOOD PRESSURE: 87 MMHG | BODY MASS INDEX: 43.6 KG/M2 | OXYGEN SATURATION: 97 % | TEMPERATURE: 98.4 F | RESPIRATION RATE: 18 BRPM | SYSTOLIC BLOOD PRESSURE: 132 MMHG | WEIGHT: 294.4 LBS | HEIGHT: 69 IN | HEART RATE: 51 BPM

## 2025-05-30 DIAGNOSIS — M99.02 SEGMENTAL AND SOMATIC DYSFUNCTION OF THORACIC REGION: ICD-10-CM

## 2025-05-30 DIAGNOSIS — G25.89 SCAPULAR DYSKINESIS: ICD-10-CM

## 2025-05-30 DIAGNOSIS — M99.00 SEGMENTAL DYSFUNCTION OF HEAD REGION: ICD-10-CM

## 2025-05-30 DIAGNOSIS — M99.01 SEGMENTAL DYSFUNCTION OF CERVICAL REGION: Primary | ICD-10-CM

## 2025-05-30 DIAGNOSIS — G47.30 SLEEP DISORDER BREATHING: Primary | ICD-10-CM

## 2025-05-30 DIAGNOSIS — M47.812 CERVICAL SPONDYLOSIS WITHOUT MYELOPATHY: ICD-10-CM

## 2025-05-30 DIAGNOSIS — E66.813 OBESITY, CLASS III, BMI 40-49.9 (MORBID OBESITY): ICD-10-CM

## 2025-05-30 PROCEDURE — 98941 CHIROPRACT MANJ 3-4 REGIONS: CPT | Performed by: CHIROPRACTOR

## 2025-05-30 ASSESSMENT — SLEEP AND FATIGUE QUESTIONNAIRES
HOW LIKELY ARE YOU TO NOD OFF OR FALL ASLEEP WHILE SITTING AND TALKING TO SOMEONE: SLIGHT CHANCE OF DOZING
SLEEP_PROBLEM_INTERFERES_DAILY_ACTIVITIES: VERY MUCH NOTICEABLE
HOW LIKELY ARE YOU TO NOD OFF OR FALL ASLEEP WHILE SITTING QUIETLY AFTER LUNCH WITHOUT ALCOHOL: MODERATE CHANCE OF DOZING
SATISFACTION_WITH_CURRENT_SLEEP_PATTERN: VERY DISSATISFIED
DIFFICULTY_STAYING_ASLEEP: SEVERE
HOW LIKELY ARE YOU TO NOD OFF OR FALL ASLEEP IN A CAR, WHILE STOPPED FOR A FEW MINUTES IN TRAFFIC: HIGH CHANCE OF DOZING
HOW LIKELY ARE YOU TO NOD OFF OR FALL ASLEEP WHILE SITTING AND READING: HIGH CHANCE OF DOZING
DIFFICULTY_FALLING_ASLEEP: SEVERE
HOW LIKELY ARE YOU TO NOD OFF OR FALL ASLEEP WHILE SITTING INACTIVE IN A PUBLIC PLACE: HIGH CHANCE OF DOZING
WAKING_TOO_EARLY: SEVERE
HOW LIKELY ARE YOU TO NOD OFF OR FALL ASLEEP WHILE WATCHING TV: HIGH CHANCE OF DOZING
HOW LIKELY ARE YOU TO NOD OFF OR FALL ASLEEP WHILE LYING DOWN TO REST IN THE AFTERNOON WHEN CIRCUMSTANCES PERMIT: HIGH CHANCE OF DOZING
ESS TOTAL SCORE: 21
WORRIED_DISTRESSED_DUE_TO_SLEEP: VERY MUCH NOTICEABLE
SLEEP_PROBLEM_NOTICEABLE_TO_OTHERS: VERY MUCH NOTICEABLE
HOW LIKELY ARE YOU TO NOD OFF OR FALL ASLEEP WHEN YOU ARE A PASSENGER IN A CAR FOR AN HOUR WITHOUT A BREAK: HIGH CHANCE OF DOZING

## 2025-05-30 ASSESSMENT — ENCOUNTER SYMPTOMS
LOSS OF SENSATION IN FEET: 0
DEPRESSION: 0
OCCASIONAL FEELINGS OF UNSTEADINESS: 0

## 2025-05-30 ASSESSMENT — COLUMBIA-SUICIDE SEVERITY RATING SCALE - C-SSRS
2. HAVE YOU ACTUALLY HAD ANY THOUGHTS OF KILLING YOURSELF?: NO
6. HAVE YOU EVER DONE ANYTHING, STARTED TO DO ANYTHING, OR PREPARED TO DO ANYTHING TO END YOUR LIFE?: NO
1. IN THE PAST MONTH, HAVE YOU WISHED YOU WERE DEAD OR WISHED YOU COULD GO TO SLEEP AND NOT WAKE UP?: NO

## 2025-05-30 ASSESSMENT — PATIENT HEALTH QUESTIONNAIRE - PHQ9
1. LITTLE INTEREST OR PLEASURE IN DOING THINGS: NOT AT ALL
2. FEELING DOWN, DEPRESSED OR HOPELESS: SEVERAL DAYS
SUM OF ALL RESPONSES TO PHQ9 QUESTIONS 1 AND 2: 1
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
## 2025-05-30 NOTE — PROGRESS NOTES
Subjective   Patient ID: Benjamín Sahu Jr. is a 55 y.o. male who presents May 30, 2025 for chiropractic care.    (2/15) [facility]    Today, the patient rates their degree of pain as a 4 out of 10 on the numeric pain rating scale.     HPI - Benjamín returns to my office for chiropractic care. He reports neck and upper back discomfort and trapezius tension. Feels symptoms have been relatively stable overall. Continues to work with orthopedics re: left shoulder. No new trauma/incident.    No other changes in health reported.       Objective   Physical Exam  Neurological:      General: No focal deficit present.      Mental Status: He is alert and oriented to person, place, and time.      Cranial Nerves: No dysarthria or facial asymmetry.      Sensory: Sensation is intact.      Motor: Motor function is intact.      Coordination: Coordination is intact.      Gait: Gait is intact.         Palpation of the following region(s) revealed:  Cervical: Temporalis left, muscular hypertonicity.  SCM bilateral, muscular hypertonicity.  Upper trapezius left, hypertonicity and tenderness.  Cervical paraspinals left, muscular hypertonicity.  Muscles of mastication bilateral, muscular hypertonicity.  Thoracic: Middle trapezius bilateral, muscular hypertonicity.  Rhomboids bilateral, muscular hypertonicity.  Pectoralis bilateral, muscular hypertonicity.        Segmental Joint(s): Segmental joint dysfunction was assessed with motion palpation and is identified in the following areas:  Cervical : C0 C1 C5  Thoracic : T2., T3, T5, and T6      Assessment/Plan   Today's Treatment Included: Chiropractic manipulation to the Segmental Joint(s) Cervical : C0 C1 C5  Segmental Joint(s) Thoracic : T2., T3, T5, and T6   Treatment Techniques Used : Activator/Tool assisted technique, Manual traction, and Other: prone PA mobs  Integrative Dry Needling (IDN) - Needles in / out:  6.  IDN: BL C7-T1 PS, BL upper trap    Soft-tissue mobilization was performed in the  following areas:   Suboccipital bilateral, Cervical paraspinal mm. bilateral, SCM bilateral, Upper Trapezius bilateral, Temporalis bilateral, and [place] bilateral  Levator Scap. bilateral, Rhomboids bilateral, and Pectoralis bilateral    F/U in 3 weeks or as-needed    The patient tolerated today's treatment with little or no additional discomfort and was instructed to contact the office for questions or concerns.

## 2025-06-03 ENCOUNTER — APPOINTMENT (OUTPATIENT)
Dept: PRIMARY CARE | Facility: CLINIC | Age: 55
End: 2025-06-03
Payer: COMMERCIAL

## 2025-06-03 VITALS
BODY MASS INDEX: 41.66 KG/M2 | WEIGHT: 291 LBS | HEART RATE: 78 BPM | SYSTOLIC BLOOD PRESSURE: 139 MMHG | DIASTOLIC BLOOD PRESSURE: 84 MMHG | TEMPERATURE: 98.6 F | HEIGHT: 70 IN

## 2025-06-03 DIAGNOSIS — Z00.00 ANNUAL PHYSICAL EXAM: ICD-10-CM

## 2025-06-03 DIAGNOSIS — R42 VERTIGO: Primary | ICD-10-CM

## 2025-06-03 DIAGNOSIS — E11.65 TYPE 2 DIABETES MELLITUS WITH HYPERGLYCEMIA, WITHOUT LONG-TERM CURRENT USE OF INSULIN: ICD-10-CM

## 2025-06-03 RX ORDER — METHYLPREDNISOLONE 4 MG/1
TABLET ORAL
Qty: 21 TABLET | Refills: 0 | Status: SHIPPED | OUTPATIENT
Start: 2025-06-03 | End: 2025-06-09

## 2025-06-03 RX ORDER — MECLIZINE HYDROCHLORIDE 25 MG/1
25 TABLET ORAL 3 TIMES DAILY PRN
Qty: 30 TABLET | Refills: 11 | Status: SHIPPED | OUTPATIENT
Start: 2025-06-03 | End: 2026-06-03

## 2025-06-03 RX ORDER — AZITHROMYCIN 250 MG/1
TABLET, FILM COATED ORAL
Qty: 6 TABLET | Refills: 0 | Status: SHIPPED | OUTPATIENT
Start: 2025-06-03 | End: 2025-06-08

## 2025-06-03 NOTE — PROGRESS NOTES
Subjective   Patient ID: Benjamín Sahu Jr. is a 55 y.o. male who presents for Vertigo.  Very pleasant 55-year-old here to establish care he has a physical scheduled later but wants to be evaluated for vertigo because he is miserable it has been going on for few days he feels nauseous turning his head feels sick to his stomach he has not had any actual vomiting no head injury no lateralizing weakness he has had it now for over a week and it seems to be getting worse no numbness or tingling in his hands any change in position of his head occurs he had some upper respiratory symptoms before the symptoms started and feels some fullness in the ears no ringing no tinnitus no nausea or vomiting no paresthesias has a history of diabetes has not had his blood work checked in quite some time his self-care has been somewhat of a deficit no paresthesias in his feet no episodes of hypo or hyperglycemia but he is long overdue for his blood work        Review of Systems  Constitutional: no chills, no fever and no night sweats.   Eyes: no blurred vision and no eyesight problems.   ENT: no hearing loss, no nasal congestion, no nasal discharge, no hoarseness and no sore throat.   Cardiovascular: no chest pain, no intermittent leg claudication, no lower extremity edema, no palpitations and no syncope.   Respiratory: no cough, no shortness of breath during exertion, no shortness of breath at rest and no wheezing.   Gastrointestinal: no abdominal pain, no blood in stools, no constipation, no diarrhea, no melena, no nausea, no rectal pain and no vomiting.   Genitourinary: no dysuria, no change in urinary frequency, no urinary hesitancy, no feelings of urinary urgency and no vaginal discharge.   Musculoskeletal: no arthralgias,  no back pain and no myalgias.   Integumentary: no new skin lesions and no rashes.   Neurological: no difficulty walking, no headache, no limb weakness, no numbness and no tingling.   Psychiatric: no anxiety, no  "depression, no anhedonia and no substance use disorders.   Endocrine: no recent weight gain and no recent weight loss.   Hematologic/Lymphatic: no tendency for easy bruising and no swollen glands .    Objective    /84   Pulse 78   Temp 37 °C (98.6 °F)   Ht 1.778 m (5' 10\")   Wt 132 kg (291 lb)   BMI 41.75 kg/m²    Physical Exam  The patient appeared well nourished and normally developed. Vital signs as documented. Head exam is unremarkable. No scleral icterus or corneal arcus noted.  Pupils are equal round reactive to light extraocular movements are intact no hemorrhages noted on funduscopic exam mouth mucous membranes are moist no exudates ears canals clear TMs are gray pearly not injected nose no rhinorrhea or epistaxis Neck is without jugular venous distension, thyromegaly, or carotid bruits. Carotid upstrokes are brisk bilaterally. Lungs are clear to auscultation and percussion. Cardiac exam reveals the PMI to be normally sized and situated. Rhythm is regular. First and second heart sounds normal. No murmurs, rubs or gallops. Abdominal exam reveals normal bowel sounds, no masses, no organomegaly and no aortic enlargement. Extremities are nonedematous and both femoral and pedal pulses are normal.  Neurologic exam DTRs are equal bilaterally no focal deficits strength is symmetrical heme lymph no palpable lymph nodes in the neck axilla or groin reducible vertigo with nystagmus strength symmetrical no lateralizing weakness    Assessment/Plan   Problem List Items Addressed This Visit       Type 2 diabetes mellitus without complication, without long-term current use of insulin    Relevant Orders    Hemoglobin A1C    Comprehensive metabolic panel    Lipid panel    Annual physical exam    Relevant Orders    Prostate Spec.Ag,Screen    CBC and Auto Differential    Vertigo - Primary    Relevant Medications    methylPREDNISolone (Medrol Dospak) 4 mg tablets    meclizine (Antivert) 25 mg tablet    azithromycin " (Zithromax) 250 mg tablet            Janet Hooker MD

## 2025-06-04 ENCOUNTER — TELEPHONE (OUTPATIENT)
Dept: PRIMARY CARE | Facility: CLINIC | Age: 55
End: 2025-06-04
Payer: COMMERCIAL

## 2025-06-05 PROBLEM — R07.89 OTHER CHEST PAIN: Status: RESOLVED | Noted: 2023-09-27 | Resolved: 2025-06-05

## 2025-06-05 PROBLEM — G47.30 INSOMNIA WITH SLEEP APNEA: Status: ACTIVE | Noted: 2025-06-05

## 2025-06-05 PROBLEM — R41.89 COGNITIVE DEFICITS: Status: ACTIVE | Noted: 2025-05-12

## 2025-06-05 PROBLEM — G47.00 INSOMNIA WITH SLEEP APNEA: Status: ACTIVE | Noted: 2025-06-05

## 2025-06-05 PROBLEM — J30.1 ALLERGIC RHINITIS DUE TO POLLEN: Status: ACTIVE | Noted: 2025-06-05

## 2025-06-05 PROBLEM — R42 VERTIGO: Status: ACTIVE | Noted: 2025-06-05

## 2025-06-05 PROBLEM — E78.2 MIXED HYPERLIPIDEMIA: Status: ACTIVE | Noted: 2025-06-05

## 2025-06-05 PROBLEM — E78.5 DYSLIPIDEMIA: Status: RESOLVED | Noted: 2021-06-03 | Resolved: 2025-06-05

## 2025-06-05 RX ORDER — BUPROPION HCL 150 MG
150 TABLET, EXTENDED RELEASE 24 HR ORAL EVERY MORNING
COMMUNITY
Start: 2025-05-05

## 2025-06-05 NOTE — PROGRESS NOTES
Nutrition Initial Assessment:     Patient Benjamín Sahu Jr. is a 55 y.o. male being seen at [facility]  who was referred by Dr. Cruz on 5/30/25 for   1. Class 3 severe obesity with serious comorbidity and body mass index (BMI) of 40.0 to 44.9 in adult        2. Obesity, Class III, BMI 40-49.9 (morbid obesity)  Referral to Nutrition Services            Nutrition Assessment    Problem List[1]    Nutrition History:  Food & Nutrition History:   He saw a RDN in the past and she was helpful. They worked on portions, thought processes.   He lost weight he thinks by walking a lot. His work varies in terms of times, 36 hours, not a physical job. Does not bring food to work. Works Monday  - Friday  Food Allergies: None;  Food Intolerances: None  Vitamin/mineral intake: None  Herbal supplements: None  Medication and Complementary/Alternative Medicine Use:   GI Symptoms: increased gas  for the past couple of months, all day  Mouth Issues: dysphagia; rough sandpaper, dr monterroso foods Teeth Issues: poor condition  Sleep Habits: 1-4 hours disrupted sleep study soon. Pains., mind wont shut down, tried melatonin but not helpful   Wake: 9:30 am Bed: 2;30 am     Diet Recall: 1 meal per day, skips breakfast and lunch  Meal 1: 2 pm - food from [employer] - spaghetti O, sports drink  Snack:   Meal 2:  varies 2:30 am - burrito, chips  or chicken from crock pot, homemade tacos  Snack:  Meal 3:   Snack:  Food Variety: Present  Oral Nutrition Supplement Use: None   Fluid Intake: water - 8 oz,  48 oz milk - 1%,  Gatorade - 20 oz - once a week, diet soda - 32 oz  Energy Intake: Fair (50-75% EEN)    Diabetes Nutrition History:  Diagnosed: did not know he had   Prior Nutrition Education: none  SMBG:   Hypoglycemia:   Current DM Medications/Insulin Regimen:  none, trying to ozempic but covered    Food Preparation:  Cooking: Patient  Grocery Shopping: Patient  Dining Out: 1 to 3 times a week    Physical Activity:   10 min walk -  difficult to walk with foot    Food Security/Insecurity: SNAP - $170    Anthropometrics:                            Weight History:   Daily Weight  06/03/25 : 132 kg (291 lb)  05/30/25 : 134 kg (294 lb 6.4 oz)  05/15/25 : 134 kg (295 lb)  04/01/25 : 136 kg (300 lb)  02/12/25 : 129 kg (285 lb)  11/25/24 : 129 kg (285 lb)  09/20/24 : 129 kg (285 lb)  08/25/24 : 120 kg (264 lb 15.9 oz)  07/17/24 : 123 kg (270 lb 1 oz)  07/15/24 : 127 kg (279 lb)    Weight Change %:  9# weight loss x 2 months, but overall 12# weight gain x  1 year  Weight History / % Weight Change: 260# - 2 years ago, 325# - highest    Nutrition Focused Physical Exam Findings:  Subcutaneous Fat Loss:   Defer Subcutaneous Fat Loss Assessment: Defer all  Defer All Reason: Visually appears well nourished with no signs of noticeable wasting    Muscle Wasting:  Defer Muscle Wasting Assessment: Defer all  Defer All Reason: Visually appears well nourished with no signs of noticeable wasting    Physical Findings:  Hair:    Eyes:    Nails:    Skin:    Respiratory:    Edema:        Nutrition Significant Labs:  CMP Trend:    Recent Labs     03/25/24  1100 01/09/24  1005 07/02/21  1232   GLUCOSE 161* 117* 121*    136 138   K 4.0 4.0 4.2    101 105   CO2 27 26 25   ANIONGAP 12 13 12   BUN 13 15 18   CREATININE 0.79 0.80 0.74   EGFR >90 >90  --    CALCIUM 8.9 8.6 8.6   ALBUMIN 4.1 3.8 4.1   ALKPHOS 58 48 78   PROT 7.1 6.6 6.9   AST 14 18 15   BILITOT 0.5 0.4 0.6   ALT 18 20 21   , CBC Trend:   Recent Labs     03/25/24  1100 07/02/21  1232 06/25/21  1224 03/12/19  1128 03/20/18  1542   WBC 7.0 7.4 6.2   < > 8.4   NRBC  --   --  0.0  --  0.0   RBC 5.49 5.20 5.58   < > 5.32   HGB 16.3 15.4 16.6   < > 16.0   HCT 48.6 46.1 51.8   < > 48.0   MCV 89 89 93   < > 90   MCH 29.7  --   --   --   --    MCHC 33.5 33.4 32.0   < > 33.3   RDW 12.8 12.8 12.8   < > 12.4    222 236   < > 246    < > = values in this interval not displayed.   , DM Specific Labs Trend  "(Includes HgbA1C, antibodies & fasting insulin):   Recent Labs     01/09/24  1005 12/02/21  1625 07/02/21  1232   HGBA1C 6.6* 6.8* 6.8   , Lipid Panel Trend:    Recent Labs     01/09/24  1005 07/02/21  1232 09/11/20  1144 06/27/19  1155   CHOL 147 122 127 123   HDL 46.6 35.3* 36.7* 37.8*   LDLCALC 80  --   --   --    LDLF  --  69 74 69   VLDL 20 18 16 16   TRIG 100 89 82 81   , and Vitamin D: No results found for: \"VITD25\"     Medications:  Current Outpatient Medications   Medication Instructions    aspirin 81 mg, Daily    azithromycin (Zithromax) 250 mg tablet Take 2 tablets (500 mg) by mouth once daily for 1 day, THEN 1 tablet (250 mg) once daily for 4 days. Take 2 tabs (500 mg) by mouth today, than 1 daily for 4 days.    blood sugar diagnostic (Blood Glucose Test) strip Check fasting glucose at least once a week, and 2 hours after a meal at least once daily. Dx: DM-2 (E11.9).    cetirizine (ZYRTEC) 10 mg, oral, Daily PRN    clindamycin (Cleocin T) 1 % lotion Apply topically.    fluticasone (Flonase) 50 mcg/actuation nasal spray 1 spray, Each Nostril, 2 times daily, Shake gently. Before first use, prime pump. After use, clean tip and replace cap.    fluticasone (Flonase) 50 mcg/actuation nasal spray 2 sprays, Each Nostril, Daily, Shake gently. Before first use, prime pump. After use, clean tip and replace cap.    meclizine (ANTIVERT) 25 mg, oral, 3 times daily PRN    melatonin 5 mg tablet 1 tablet, Nightly    methylPREDNISolone (Medrol Dospak) 4 mg tablets Take as directed on package.    nitroglycerin (NITROSTAT) 0.4 mg, sublingual, Every 5 min PRN, May repeat dose every 5 minutes for up to 3 doses total.    pantoprazole (PROTONIX) 20 mg    Pataday Once Daily Relief 0.2 % ophthalmic solution Administer 1 drop into both eyes once daily as needed for allergies.    rosuvastatin (CRESTOR) 40 mg, oral, Daily    Wegovy 0.25 mg, subcutaneous, Every 7 days    Wellbutrin  mg, Every morning        Estimated Needs:  " Did not discuss   ;     ;     ;     ;                    Nutrition Diagnosis   Malnutrition Diagnosis  Patient has Malnutrition Diagnosis: No    Nutrition Diagnosis  Patient has Nutrition Diagnosis: Yes  Diagnosis Status (1): New  Nutrition Diagnosis 1: Food and nutrition related knowledge deficit  Related to (1): Lack of or limited prior nutrition-related education  As Evidenced by (1): diet recall, need for diabetic diet  Additional Nutrition Diagnosis: Diagnosis 2  Diagnosis Status (2): New  Nutrition Diagnosis 2: Altered nutrition related to laboratory values  Related to (2): organ dysfunction that leads to biochemical changes  As Evidenced by (2): elevated  A1c 6.9%       Nutrition Interventions/Recommendations   Nutrition Prescription: Oral nutrition General Healthy diet with focus on CHO control for DM management    Nutrition Interventions:   Food and Nutrient Delivery:       Coordination of Care:       Nutrition Education:   Nutrition Education Content: Content related nutrition education  Balanced meals using plate method, timing of meals, staying hydrated with water or other low calorie beverages, benefits from exercise, reviewed basics of what is a CHO and portion sizes of CHO food for meal planning, reviewed normal glucose metabolism and what happens to glucose metabolism with DM    Nutrition Education Topics Discussed:   1) Reviewed diabetic diet including carbohydrate-containing food, appropriate carbs per meal as well as appropriate portion sizes. We discussed that 1 serving of a carbohydrate is equal to 15 gram and patient should consume no more than 3-4 servings per meal for weight maintenance or 2-3 per meal for weight loss. Education materials were provided and meal and snack options discussed. Aim for A1c <7%    2) We reviewed the food plate method to help improve healthy eating habits. We discussed that half of the plate should contain 2 non-starchy vegetables (all vegetable besides peas, corn  and potatoes), 1/4 of the plate should contain lean protein and 1/4 of the plate should contain a healthy starch.    3)     We reviewed the importance of having consistent meals and snacks throughout the day to improve or maintain good glycemic control and to increase metabolism to aid with weight loss. Pt should aim to consume a meal or snack every 3-4 hours which contains a lean protein (lean chicken, turkey, fish, eggs, low fat yogurt, nuts, peanut butter, bean) and healthy starch (fruits, whole grains)      Educational Handouts Provided: Keys for a Healthy Lifestyle Handout, ADA Plate Method, and [facility] Balanced Breakfast     Nutrition Counseling:   Nutrition Counseling Strategies : Nutrition counseling based on motivational interviewing strategy, Nutrition counseling based on goal setting strategy    Patient Goals:      Readiness to Change : Good  Level of Understanding : Good  Anticipated Compliant : Good         Nutrition Monitoring and Evaluation   Food and Nutrient Intake  Monitoring and Evaluation Plan: Meal/snack pattern  Meal/Snack Pattern: Estimated meal and snack pattern  Criteria: Monitor patient's progress towards meal and snack goal(s)         Anthropometric measurements  Monitoring and Evaluation Plan: Weight  Criteria: Monitor patient's progress towards intentional weight loss of 0.5-2 lb per week, trending toward a clinically significant weight loss of 5-10% of current body weight.    Biochemical Data, Medical Tests and Procedures  Monitoring and Evaluation Plan: Lipid profile, Glucose/endocrine profile  Glucose/Endocrine Profile: Hemoglobin A1c (HgbA1c)  Criteria: <7%  Lipid Profile: Triglycerides, serum  Criteria: CHOL <200;  HDL 40-60;  LDL <70;  TG <150 mg/dL         Goal Status:           Follow Up: 6-8 weeks              [1]   Patient Active Problem List  Diagnosis    Abnormal stress test    Acne vulgaris    Acquired absence of other organs    Alcohol abuse, in remission    Allergic rhinitis     Chronic mastoiditis    Chronic rhinitis    Chronic sinusitis    Scapular dyskinesis    Antalgic gait    Arthralgia of right temporomandibular joint    Temporomandibular joint click    TMJ pain dysfunction syndrome    Coronary artery disease involving native coronary artery of native heart with angina pectoris    Carpal tunnel syndrome, bilateral    Cervical spondylosis without myelopathy    Cholesteatoma, left    Chondromalacia of left patella    Chronic otitis media of both ears    Chronic ankle pain    Chronic pain    Colon polyp    Conductive hearing loss, bilateral    Coronary artery calcification seen on CT scan    CSF leak    Decreased range of motion of left ankle    Disorder of vocal cords    ETD (Eustachian tube dysfunction), bilateral    Gastrointestinal hemorrhage    Generalized anxiety disorder    GERD (gastroesophageal reflux disease)    Granuloma of skin    Other bursal cyst, unspecified hand    History of stroke with residual effects    Immune deficiency disorder (Multi)    Impaired strength of lower extremity    Impetigo    Incipient senile cataract    Ankle instability    Ischemic optic neuropathy, right    Optic atrophy of right eye    Melanocytic nevi of trunk    Melanocytic nevi of unspecified upper limb, including shoulder    Melanocytic nevi of other parts of face    Migraine with visual aura    Mixed hearing loss of left ear    Class 3 severe obesity with serious comorbidity and body mass index (BMI) of 40.0 to 44.9 in adult    Myopia    Myringotomy tube(s) status    Neoplasm of uncertain behavior of skin    Neuritis    MARIFER (obstructive sleep apnea)    Otalgia, right ear    Other seborrheic keratosis    Personal history of colonic polyps    Pes cavus    PND (paroxysmal nocturnal dyspnea)    Pyogenic granuloma    Segmental and somatic dysfunction    Skin changes due to chronic exposure to nonionizing radiation, unspecified    Subjective tinnitus of left ear    Tonsil asymmetry    Trigeminal  neuropathy    VMR (vasomotor rhinitis)    Dyspnea on exertion    Strain of rotator cuff of left shoulder    Mild major depression    Type 2 diabetes mellitus without complication, without long-term current use of insulin    Annual physical exam    Cognitive deficits    Insomnia with sleep apnea    Allergic rhinitis due to pollen    Mixed hyperlipidemia    Vertigo

## 2025-06-06 ENCOUNTER — NUTRITION (OUTPATIENT)
Dept: NUTRITION | Facility: CLINIC | Age: 55
End: 2025-06-06
Payer: COMMERCIAL

## 2025-06-06 DIAGNOSIS — E66.813 OBESITY, CLASS III, BMI 40-49.9 (MORBID OBESITY): ICD-10-CM

## 2025-06-06 DIAGNOSIS — E66.813 CLASS 3 SEVERE OBESITY WITH SERIOUS COMORBIDITY AND BODY MASS INDEX (BMI) OF 40.0 TO 44.9 IN ADULT: Primary | ICD-10-CM

## 2025-06-06 PROCEDURE — 97802 MEDICAL NUTRITION INDIV IN: CPT | Performed by: DIETITIAN, REGISTERED

## 2025-06-12 ENCOUNTER — APPOINTMENT (OUTPATIENT)
Dept: INTEGRATIVE MEDICINE | Facility: CLINIC | Age: 55
End: 2025-06-12
Payer: COMMERCIAL

## 2025-06-14 PROBLEM — E11.9 TYPE 2 DIABETES MELLITUS WITHOUT COMPLICATION, WITHOUT LONG-TERM CURRENT USE OF INSULIN: Status: RESOLVED | Noted: 2024-05-09 | Resolved: 2025-06-14

## 2025-06-14 PROBLEM — E11.65 TYPE 2 DIABETES MELLITUS WITH HYPERGLYCEMIA, WITHOUT LONG-TERM CURRENT USE OF INSULIN: Status: ACTIVE | Noted: 2025-06-14

## 2025-06-18 ENCOUNTER — OFFICE VISIT (OUTPATIENT)
Dept: PRIMARY CARE | Facility: CLINIC | Age: 55
End: 2025-06-18
Payer: COMMERCIAL

## 2025-06-18 VITALS
HEIGHT: 70 IN | SYSTOLIC BLOOD PRESSURE: 121 MMHG | WEIGHT: 294 LBS | HEART RATE: 75 BPM | OXYGEN SATURATION: 94 % | BODY MASS INDEX: 42.09 KG/M2 | TEMPERATURE: 97.5 F | DIASTOLIC BLOOD PRESSURE: 75 MMHG

## 2025-06-18 DIAGNOSIS — H10.45 CHRONIC ALLERGIC CONJUNCTIVITIS: ICD-10-CM

## 2025-06-18 DIAGNOSIS — J30.9 CHRONIC ALLERGIC RHINITIS: Primary | ICD-10-CM

## 2025-06-18 PROCEDURE — 3078F DIAST BP <80 MM HG: CPT | Performed by: FAMILY MEDICINE

## 2025-06-18 PROCEDURE — 1036F TOBACCO NON-USER: CPT | Performed by: FAMILY MEDICINE

## 2025-06-18 PROCEDURE — 99213 OFFICE O/P EST LOW 20 MIN: CPT | Performed by: FAMILY MEDICINE

## 2025-06-18 PROCEDURE — 3074F SYST BP LT 130 MM HG: CPT | Performed by: FAMILY MEDICINE

## 2025-06-18 PROCEDURE — 3008F BODY MASS INDEX DOCD: CPT | Performed by: FAMILY MEDICINE

## 2025-06-18 RX ORDER — OLOPATADINE HYDROCHLORIDE 1 MG/ML
1 SOLUTION OPHTHALMIC 2 TIMES DAILY PRN
Qty: 5 ML | Refills: 3 | Status: SHIPPED | OUTPATIENT
Start: 2025-06-18 | End: 2025-10-16

## 2025-06-18 RX ORDER — FLUTICASONE PROPIONATE 50 MCG
1 SPRAY, SUSPENSION (ML) NASAL 2 TIMES DAILY
Qty: 48 G | Refills: 11 | Status: SHIPPED | OUTPATIENT
Start: 2025-06-18 | End: 2025-07-18

## 2025-06-18 RX ORDER — MONTELUKAST SODIUM 10 MG/1
10 TABLET ORAL NIGHTLY
Qty: 90 TABLET | Refills: 3 | Status: SHIPPED | OUTPATIENT
Start: 2025-06-18

## 2025-06-18 NOTE — PROGRESS NOTES
"Subjective   Patient ID: Benjamín Sahu Jr. is a 55 y.o. male who presents for URI (Pt presents with congestion x1 month/Pt is fasting).  HPI    Review of Systems    Objective    /75   Pulse 75   Temp 36.4 °C (97.5 °F)   Ht 1.765 m (5' 9.5\")   Wt 133 kg (294 lb)   SpO2 94%   BMI 42.79 kg/m²    Physical Exam    Assessment/Plan   Problem List Items Addressed This Visit    None  Visit Diagnoses         Chronic allergic rhinitis    -  Primary    Relevant Medications    fluticasone (Flonase) 50 mcg/actuation nasal spray    montelukast (Singulair) 10 mg tablet                 Janet Hooker MD  " thyromegaly, or carotid bruits. Carotid upstrokes are brisk bilaterally. Lungs are clear to auscultation and percussion. Cardiac exam reveals the PMI to be normally sized and situated. Rhythm is regular. First and second heart sounds normal. No murmurs, rubs or gallops. Abdominal exam reveals normal bowel sounds, no masses, no organomegaly and no aortic enlargement. Extremities are nonedematous and both femoral and pedal pulses are normal.  Neurologic exam DTRs are equal bilaterally no focal deficits strength is symmetrical heme lymph no palpable lymph nodes in the neck axilla or groin  Nose congestion lungs clear Harsh cough conjunctiva injected  Assessment/Plan   Problem List Items Addressed This Visit    None  Visit Diagnoses         Chronic allergic rhinitis    -  Primary    Relevant Medications    fluticasone (Flonase) 50 mcg/actuation nasal spray    montelukast (Singulair) 10 mg tablet      Chronic allergic conjunctivitis        Relevant Medications    olopatadine (Patanol) 0.1 % ophthalmic solution                 Janet Hooker MD

## 2025-06-19 LAB
ALBUMIN SERPL-MCNC: 3.9 G/DL (ref 3.6–5.1)
ALP SERPL-CCNC: 54 U/L (ref 35–144)
ALT SERPL-CCNC: 19 U/L (ref 9–46)
ANION GAP SERPL CALCULATED.4IONS-SCNC: 10 MMOL/L (CALC) (ref 7–17)
AST SERPL-CCNC: 14 U/L (ref 10–35)
BASOPHILS # BLD AUTO: 78 CELLS/UL (ref 0–200)
BASOPHILS NFR BLD AUTO: 1.1 %
BILIRUB SERPL-MCNC: 0.3 MG/DL (ref 0.2–1.2)
BUN SERPL-MCNC: 18 MG/DL (ref 7–25)
CALCIUM SERPL-MCNC: 8.9 MG/DL (ref 8.6–10.3)
CHLORIDE SERPL-SCNC: 105 MMOL/L (ref 98–110)
CHOLEST SERPL-MCNC: 224 MG/DL
CHOLEST/HDLC SERPL: 5 (CALC)
CO2 SERPL-SCNC: 24 MMOL/L (ref 20–32)
CREAT SERPL-MCNC: 0.81 MG/DL (ref 0.7–1.3)
EGFRCR SERPLBLD CKD-EPI 2021: 103 ML/MIN/1.73M2
EOSINOPHIL # BLD AUTO: 192 CELLS/UL (ref 15–500)
EOSINOPHIL NFR BLD AUTO: 2.7 %
ERYTHROCYTE [DISTWIDTH] IN BLOOD BY AUTOMATED COUNT: 13.8 % (ref 11–15)
EST. AVERAGE GLUCOSE BLD GHB EST-MCNC: 183 MG/DL
EST. AVERAGE GLUCOSE BLD GHB EST-SCNC: 10.1 MMOL/L
GLUCOSE SERPL-MCNC: 171 MG/DL (ref 65–99)
HBA1C MFR BLD: 8 %
HCT VFR BLD AUTO: 49.4 % (ref 38.5–50)
HDLC SERPL-MCNC: 45 MG/DL
HGB BLD-MCNC: 16.3 G/DL (ref 13.2–17.1)
LDLC SERPL CALC-MCNC: 146 MG/DL (CALC)
LYMPHOCYTES # BLD AUTO: 1938 CELLS/UL (ref 850–3900)
LYMPHOCYTES NFR BLD AUTO: 27.3 %
MCH RBC QN AUTO: 30.4 PG (ref 27–33)
MCHC RBC AUTO-ENTMCNC: 33 G/DL (ref 32–36)
MCV RBC AUTO: 92.2 FL (ref 80–100)
MONOCYTES # BLD AUTO: 724 CELLS/UL (ref 200–950)
MONOCYTES NFR BLD AUTO: 10.2 %
NEUTROPHILS # BLD AUTO: 4168 CELLS/UL (ref 1500–7800)
NEUTROPHILS NFR BLD AUTO: 58.7 %
NONHDLC SERPL-MCNC: 179 MG/DL (CALC)
PLATELET # BLD AUTO: 273 THOUSAND/UL (ref 140–400)
PMV BLD REES-ECKER: 10.2 FL (ref 7.5–12.5)
POTASSIUM SERPL-SCNC: 4.3 MMOL/L (ref 3.5–5.3)
PROT SERPL-MCNC: 6.4 G/DL (ref 6.1–8.1)
PSA SERPL-MCNC: 1.52 NG/ML
RBC # BLD AUTO: 5.36 MILLION/UL (ref 4.2–5.8)
SODIUM SERPL-SCNC: 139 MMOL/L (ref 135–146)
TRIGL SERPL-MCNC: 190 MG/DL
WBC # BLD AUTO: 7.1 THOUSAND/UL (ref 3.8–10.8)

## 2025-07-07 ENCOUNTER — OFFICE VISIT (OUTPATIENT)
Dept: CARDIOLOGY | Facility: CLINIC | Age: 55
End: 2025-07-07
Payer: COMMERCIAL

## 2025-07-07 VITALS
HEART RATE: 53 BPM | WEIGHT: 298.8 LBS | BODY MASS INDEX: 42.78 KG/M2 | SYSTOLIC BLOOD PRESSURE: 128 MMHG | DIASTOLIC BLOOD PRESSURE: 83 MMHG | OXYGEN SATURATION: 94 % | HEIGHT: 70 IN

## 2025-07-07 DIAGNOSIS — I25.10 CORONARY ARTERY DISEASE INVOLVING NATIVE CORONARY ARTERY OF NATIVE HEART WITHOUT ANGINA PECTORIS: ICD-10-CM

## 2025-07-07 DIAGNOSIS — R07.89 OTHER CHEST PAIN: Primary | ICD-10-CM

## 2025-07-07 DIAGNOSIS — R06.09 DOE (DYSPNEA ON EXERTION): ICD-10-CM

## 2025-07-07 DIAGNOSIS — E11.9 TYPE 2 DIABETES MELLITUS WITHOUT COMPLICATION, WITHOUT LONG-TERM CURRENT USE OF INSULIN: ICD-10-CM

## 2025-07-07 DIAGNOSIS — R60.9 EDEMA, UNSPECIFIED TYPE: ICD-10-CM

## 2025-07-07 DIAGNOSIS — I25.10 CORONARY ARTERY DISEASE INVOLVING NATIVE CORONARY ARTERY OF NATIVE HEART WITHOUT ANGINA PECTORIS: Primary | ICD-10-CM

## 2025-07-07 PROCEDURE — 3074F SYST BP LT 130 MM HG: CPT | Performed by: INTERNAL MEDICINE

## 2025-07-07 PROCEDURE — 1036F TOBACCO NON-USER: CPT | Performed by: INTERNAL MEDICINE

## 2025-07-07 PROCEDURE — 93005 ELECTROCARDIOGRAM TRACING: CPT | Performed by: INTERNAL MEDICINE

## 2025-07-07 PROCEDURE — 3079F DIAST BP 80-89 MM HG: CPT | Performed by: INTERNAL MEDICINE

## 2025-07-07 PROCEDURE — 99214 OFFICE O/P EST MOD 30 MIN: CPT | Performed by: INTERNAL MEDICINE

## 2025-07-07 PROCEDURE — 3008F BODY MASS INDEX DOCD: CPT | Performed by: INTERNAL MEDICINE

## 2025-07-07 PROCEDURE — 99212 OFFICE O/P EST SF 10 MIN: CPT

## 2025-07-07 RX ORDER — DOBUTAMINE HYDROCHLORIDE 400 MG/100ML
10 INJECTION INTRAVENOUS CONTINUOUS
OUTPATIENT
Start: 2025-07-07

## 2025-07-07 RX ORDER — SEMAGLUTIDE 0.25 MG/.5ML
0.25 INJECTION, SOLUTION SUBCUTANEOUS
Qty: 6 ML | Refills: 3 | Status: SHIPPED | OUTPATIENT
Start: 2025-07-07 | End: 2026-07-07

## 2025-07-07 RX ORDER — FUROSEMIDE 20 MG/1
20 TABLET ORAL DAILY
Qty: 90 TABLET | Refills: 1 | Status: SHIPPED | OUTPATIENT
Start: 2025-07-07 | End: 2026-07-07

## 2025-07-07 RX ORDER — TIRZEPATIDE 2.5 MG/.5ML
2.5 INJECTION, SOLUTION SUBCUTANEOUS
Qty: 12 PEN | Refills: 3 | Status: SHIPPED | OUTPATIENT
Start: 2025-07-07 | End: 2026-07-07

## 2025-07-07 RX ORDER — ROSUVASTATIN CALCIUM 40 MG/1
40 TABLET, COATED ORAL DAILY
Qty: 90 TABLET | Refills: 3 | Status: SHIPPED | OUTPATIENT
Start: 2025-07-07

## 2025-07-07 ASSESSMENT — ENCOUNTER SYMPTOMS
ABDOMINAL PAIN: 0
CHILLS: 0
DYSURIA: 0
VOMITING: 0
LOSS OF SENSATION IN FEET: 0
FEVER: 0
DEPRESSION: 0
MEMORY LOSS: 0
FALLS: 0
HEMOPTYSIS: 0
COUGH: 0
DIARRHEA: 0
OCCASIONAL FEELINGS OF UNSTEADINESS: 0
HEMATURIA: 0
WHEEZING: 0
MYALGIAS: 0
ALTERED MENTAL STATUS: 0
HEADACHES: 0
CONSTIPATION: 0
NAUSEA: 0
BLOATING: 0

## 2025-07-07 ASSESSMENT — COLUMBIA-SUICIDE SEVERITY RATING SCALE - C-SSRS
1. IN THE PAST MONTH, HAVE YOU WISHED YOU WERE DEAD OR WISHED YOU COULD GO TO SLEEP AND NOT WAKE UP?: NO
2. HAVE YOU ACTUALLY HAD ANY THOUGHTS OF KILLING YOURSELF?: NO
6. HAVE YOU EVER DONE ANYTHING, STARTED TO DO ANYTHING, OR PREPARED TO DO ANYTHING TO END YOUR LIFE?: NO

## 2025-07-07 ASSESSMENT — PAIN SCALES - GENERAL: PAINLEVEL_OUTOF10: 0-NO PAIN

## 2025-07-07 NOTE — PROGRESS NOTES
Chief Complaint   Patient presents with    Coronary Artery Disease    Chest Pain        HPI  56 yo WM w/ h/o CAD, HLD, DM2, GERD, mild MARIFER (intol CPAP), TOB (cigars, 20 yr, quit '09) now here for cardiology f/u. He continues to have freq L upper chest/shoulder/shoulder blade pain worse with raising arm with occ hand tingling; occ worse w/ exertion and occ assoc diaphoresis. No other chest pain.  No dyspnea at rest. +occ CARMONA (mod exertion). No orthopnea/PND. No palps. No LH/dizziness/syncope. +LE edema. No claudication. No cough. +occ fatigue. +snoring.  He occ walks with no symptoms.  ECG 9/16: SB (54)  ECG 11/16: SB (56)  ECG 4/20: SB (53)  ECG 9/21: SB (59), nonsp ST changes  ECG 3/24: SR (79), normal  ECG 3/24: SR (79), normal  ECG 4/25: SR (61), normal  ECG 7/25: SB (53), normal  HM 9/21: SR, HR  (avg 67), SVT x12 (long 11b)  Echo 7/16: EF 55-60%, up nl LA size  Echo 1/18: EF 60-65%  24h HM 7/16: SR, HR  (avg 69)  ALEC 5/10: no ischemia  ETT 11/16: dyspnea, 1mm DS ST depression  Cath 3/11: LM ok, pLAD 30%, p-mLAD 50%, mLAD 40%, p-mLCx 40%, RCA ok, EF 55%, no MR, no AI, no AS, nl Ao  Cath 3/17: LM ok, pLAD 30%, p-mLAD 50%, mLAD 40%, p-mLCx 40%, RCA ok, EF 55%, no AS/AI, no MR  CXR 7/22: no acute abnl  CXR 1/24: no acute abnl  CTA chest 11/16: no PE, nl Ao, coronary calcium, slight mosaic atten of lungs may be related to RAD  Sleep study 3/19: mild MARIFER  MRI brain 6/19: no acute abnl  Carotid US 7/16: no HDSS     Review of Systems   Constitutional: Negative for chills, fever and malaise/fatigue.   HENT:  Negative for hearing loss.    Eyes:  Negative for visual disturbance.   Respiratory:  Negative for cough, hemoptysis and wheezing.    Skin:  Negative for rash.   Musculoskeletal:  Negative for falls and myalgias.   Gastrointestinal:  Negative for bloating, abdominal pain, constipation, diarrhea, dysphagia, nausea and vomiting.   Genitourinary:  Negative for dysuria and hematuria.   Neurological:   Negative for headaches.   Psychiatric/Behavioral:  Negative for altered mental status, depression and memory loss.       Social History     Tobacco Use    Smoking status: Former     Types: Cigarettes     Passive exposure: Never    Smokeless tobacco: Never   Substance Use Topics    Alcohol use: Yes     Alcohol/week: 3.0 standard drinks of alcohol     Types: 3 Shots of liquor per week      Family History   Problem Relation Name Age of Onset    Other (cardiac disorder) Father      Other (hypertension) Father      Heart attack Father      Other (Cardiac defibrilator) Father      Hypertension Father's Brother      Hypertension Paternal Grandmother      Hypertension Paternal Grandfather        Allergies   Allergen Reactions    Ragweed Itching     Other reaction(s): Other: See Comments   sinus      Current Outpatient Medications   Medication Instructions    aspirin 81 mg, Daily    blood sugar diagnostic (Blood Glucose Test) strip Check fasting glucose at least once a week, and 2 hours after a meal at least once daily. Dx: DM-2 (E11.9).    cetirizine (ZYRTEC) 10 mg, oral, Daily PRN    clindamycin (Cleocin T) 1 % lotion Apply topically.    fluticasone (Flonase) 50 mcg/actuation nasal spray 1 spray, Each Nostril, 2 times daily, Shake gently. Before first use, prime pump. After use, clean tip and replace cap.    meclizine (ANTIVERT) 25 mg, oral, 3 times daily PRN    melatonin 5 mg tablet 1 tablet, Nightly    montelukast (SINGULAIR) 10 mg, oral, Nightly    nitroglycerin (NITROSTAT) 0.4 mg, sublingual, Every 5 min PRN, May repeat dose every 5 minutes for up to 3 doses total.    olopatadine (Patanol) 0.1 % ophthalmic solution 1 drop, Both Eyes, 2 times daily PRN    pantoprazole (PROTONIX) 20 mg    Pataday Once Daily Relief 0.2 % ophthalmic solution Administer 1 drop into both eyes once daily as needed for allergies.    rosuvastatin (CRESTOR) 40 mg, oral, Daily    Wellbutrin  mg, Every morning      /83 (BP Location:  "Right arm, Patient Position: Sitting, BP Cuff Size: Large adult)   Pulse 53   Ht 1.765 m (5' 9.5\")   Wt 136 kg (298 lb 12.8 oz)   SpO2 94%   BMI 43.49 kg/m²       Physical Exam  Constitutional:       Appearance: Normal appearance.   HENT:      Head: Normocephalic and atraumatic.      Nose: Nose normal.   Neck:      Vascular: No carotid bruit.   Cardiovascular:      Rate and Rhythm: Regular rhythm. Bradycardia present.      Heart sounds: No murmur heard.  Pulmonary:      Effort: Pulmonary effort is normal.      Breath sounds: Normal breath sounds.   Abdominal:      Palpations: Abdomen is soft.      Tenderness: There is no abdominal tenderness.   Musculoskeletal:      Right lower le+ Pitting Edema present.      Left lower le+ Pitting Edema present.   Skin:     General: Skin is warm and dry.   Neurological:      General: No focal deficit present.      Mental Status: He is alert.   Psychiatric:         Mood and Affect: Mood normal.         Judgment: Judgment normal.        Results/Data   Cr 0.81, K 4.3, , HDL 45, , Chol 224, HGB 16.3, , hgba1c 8.0  3/24 Cr 0.79, K 4.0, LFT nl, HGB 16.3, , HSTPN 5   Cr 0.8, K 4.0, LFT nl, LDL 80, HDL 47, , Chol 147   hgba1c 6.8   Cr 0.74, K 4.2, LFT nl, LDL 69, HDL 35, TG 89, Chol 122, HGB 15.4, , hgba1c 6.8  3/20 Cr 0.95, K 4.3, LFT nl, hgba1c 6.5   CRP 0.3   Cr 0.78, K 4.1, LFT nl, LDL 69, HDL 38, TG 81, Chol 123, HGB 15.1, , hgba1c 6.2, TSH 1.45  3/19 Cr 0.69, K 4.0, Mg 1.9, LFT nl, LDL 58, HDL 37, TG 70, Chol 109, HGB 16, , hgba1c 6.3, TSH 1.2  3/18 Cr 0.69, K 4.3, HGB 16,    Cr 0.8, K 4.3, LFT nl, LDL 48, HDL 49, TG 98, Chol 117, HGB 16.4, , hgba1c 6.3  3/17 Cr 0.75, K 4.0, LFT nl, LDL 94, HDL 36, , Chol 175, HGB 15.2,    Cr 0.91, K 4.2, HGB 14.8, , TPN neg, BNP 30   Cr 0.83, K 4.2, LFT nl, , HDL 42, , CHol 181, hgba1c 6.0 "     Assessment/Plan   54 yo WM w/ h/o CAD, HLD, DM2, mild MARIFER (intol CPAP), GERD, TOB (cigars, 20 yr, quit '09) now w/ L upper chest/back pain worse with moving arm c/w MSK etiology/rotator cuff; however, occ worse with exertion and occ assoc diaph. So will check DSE to eval for ischemia. Due to CARMONA/edema, also check echo. Add Lasix.  ECG today normal.  Cath 3/17 with at most 50% stenosis.  He sleeps with wedge pillow to keep him on his side (for snoring/mild MARIFER).  -continue ASA 81 qd  -continue Rosuva 40 qhs (make sure taking daily)  -add Lasix 20 qd  -add Mounjaro qwk (DM)  -f/u 1 year (earlier if needed)     Marques Gilmore MD

## 2025-07-08 LAB
ATRIAL RATE: 53 BPM
P AXIS: 43 DEGREES
P OFFSET: 179 MS
P ONSET: 128 MS
PR INTERVAL: 186 MS
Q ONSET: 221 MS
QRS COUNT: 9 BEATS
QRS DURATION: 92 MS
QT INTERVAL: 426 MS
QTC CALCULATION(BAZETT): 399 MS
QTC FREDERICIA: 408 MS
R AXIS: 38 DEGREES
T AXIS: 80 DEGREES
T OFFSET: 434 MS
VENTRICULAR RATE: 53 BPM

## 2025-07-10 ENCOUNTER — SPECIALTY PHARMACY (OUTPATIENT)
Dept: PHARMACY | Facility: CLINIC | Age: 55
End: 2025-07-10

## 2025-07-10 DIAGNOSIS — E11.9 TYPE 2 DIABETES MELLITUS WITHOUT COMPLICATION, WITHOUT LONG-TERM CURRENT USE OF INSULIN: Primary | ICD-10-CM

## 2025-07-10 DIAGNOSIS — R06.09 DYSPNEA ON EXERTION: ICD-10-CM

## 2025-07-10 DIAGNOSIS — R07.89 OTHER CHEST PAIN: Primary | ICD-10-CM

## 2025-07-10 RX ORDER — SEMAGLUTIDE 0.68 MG/ML
0.25 INJECTION, SOLUTION SUBCUTANEOUS WEEKLY
Qty: 6 ML | Refills: 3 | Status: SHIPPED | OUTPATIENT
Start: 2025-07-10

## 2025-07-11 ENCOUNTER — APPOINTMENT (OUTPATIENT)
Dept: PODIATRY | Facility: CLINIC | Age: 55
End: 2025-07-11
Payer: COMMERCIAL

## 2025-07-14 ENCOUNTER — APPOINTMENT (OUTPATIENT)
Dept: CARDIOLOGY | Facility: CLINIC | Age: 55
End: 2025-07-14
Payer: COMMERCIAL

## 2025-07-15 ENCOUNTER — HOSPITAL ENCOUNTER (OUTPATIENT)
Dept: CARDIOLOGY | Facility: CLINIC | Age: 55
Discharge: HOME | End: 2025-07-15
Payer: COMMERCIAL

## 2025-07-15 DIAGNOSIS — R60.9 EDEMA, UNSPECIFIED TYPE: ICD-10-CM

## 2025-07-15 DIAGNOSIS — R07.89 OTHER CHEST PAIN: ICD-10-CM

## 2025-07-15 DIAGNOSIS — R06.09 DOE (DYSPNEA ON EXERTION): ICD-10-CM

## 2025-07-15 PROCEDURE — 93350 STRESS TTE ONLY: CPT | Performed by: INTERNAL MEDICINE

## 2025-07-15 PROCEDURE — 2500000004 HC RX 250 GENERAL PHARMACY W/ HCPCS (ALT 636 FOR OP/ED): Mod: SE | Performed by: INTERNAL MEDICINE

## 2025-07-15 PROCEDURE — 93018 CV STRESS TEST I&R ONLY: CPT | Performed by: INTERNAL MEDICINE

## 2025-07-15 PROCEDURE — 93016 CV STRESS TEST SUPVJ ONLY: CPT | Performed by: INTERNAL MEDICINE

## 2025-07-15 PROCEDURE — 93017 CV STRESS TEST TRACING ONLY: CPT

## 2025-07-15 RX ADMIN — PERFLUTREN 10 ML OF DILUTION: 6.52 INJECTION, SUSPENSION INTRAVENOUS at 10:58

## 2025-07-31 ENCOUNTER — TELEPHONE (OUTPATIENT)
Dept: CARDIOLOGY | Facility: HOSPITAL | Age: 55
End: 2025-07-31

## 2025-07-31 DIAGNOSIS — E11.9 TYPE 2 DIABETES MELLITUS WITHOUT COMPLICATION, WITHOUT LONG-TERM CURRENT USE OF INSULIN: Primary | ICD-10-CM

## 2025-07-31 RX ORDER — DULAGLUTIDE 0.75 MG/.5ML
0.75 INJECTION, SOLUTION SUBCUTANEOUS WEEKLY
Qty: 12 EACH | Refills: 3 | Status: SHIPPED | OUTPATIENT
Start: 2025-07-31

## 2025-08-01 PROCEDURE — RXMED WILLOW AMBULATORY MEDICATION CHARGE

## 2025-08-05 ENCOUNTER — PHARMACY VISIT (OUTPATIENT)
Dept: PHARMACY | Facility: CLINIC | Age: 55
End: 2025-08-05
Payer: MEDICAID

## 2025-08-15 ENCOUNTER — APPOINTMENT (OUTPATIENT)
Dept: URGENT CARE | Age: 55
End: 2025-08-15
Payer: COMMERCIAL

## 2025-08-15 ENCOUNTER — OFFICE VISIT (OUTPATIENT)
Dept: URGENT CARE | Age: 55
End: 2025-08-15
Payer: COMMERCIAL

## 2025-08-15 VITALS
TEMPERATURE: 98 F | WEIGHT: 304.2 LBS | SYSTOLIC BLOOD PRESSURE: 152 MMHG | BODY MASS INDEX: 44.28 KG/M2 | DIASTOLIC BLOOD PRESSURE: 90 MMHG | OXYGEN SATURATION: 95 % | HEART RATE: 70 BPM | RESPIRATION RATE: 15 BRPM

## 2025-08-15 DIAGNOSIS — J01.90 ACUTE BACTERIAL RHINOSINUSITIS: Primary | ICD-10-CM

## 2025-08-15 DIAGNOSIS — J06.9 VIRAL URI: ICD-10-CM

## 2025-08-15 DIAGNOSIS — B96.89 ACUTE BACTERIAL RHINOSINUSITIS: Primary | ICD-10-CM

## 2025-08-15 DIAGNOSIS — H65.91 OME (OTITIS MEDIA WITH EFFUSION), RIGHT: ICD-10-CM

## 2025-08-15 LAB
POC CORONAVIRUS SARS-COV-2 PCR: NEGATIVE
POC HUMAN RHINOVIRUS PCR: POSITIVE
POC INFLUENZA A VIRUS PCR: NEGATIVE
POC INFLUENZA B VIRUS PCR: NEGATIVE
POC RESPIRATORY SYNCYTIAL VIRUS PCR: NEGATIVE

## 2025-08-15 PROCEDURE — 87631 RESP VIRUS 3-5 TARGETS: CPT

## 2025-08-15 PROCEDURE — 3080F DIAST BP >= 90 MM HG: CPT

## 2025-08-15 PROCEDURE — 99213 OFFICE O/P EST LOW 20 MIN: CPT

## 2025-08-15 PROCEDURE — 3077F SYST BP >= 140 MM HG: CPT

## 2025-08-15 RX ORDER — AMOXICILLIN AND CLAVULANATE POTASSIUM 875; 125 MG/1; MG/1
1 TABLET, FILM COATED ORAL 2 TIMES DAILY
Qty: 20 TABLET | Refills: 0 | Status: SHIPPED | OUTPATIENT
Start: 2025-08-15 | End: 2025-08-25

## 2025-08-15 ASSESSMENT — ENCOUNTER SYMPTOMS
CONSTIPATION: 0
DIARRHEA: 0
CHILLS: 0
COUGH: 1
RHINORRHEA: 1
SINUS PRESSURE: 1
CHEST TIGHTNESS: 0
TROUBLE SWALLOWING: 0
SHORTNESS OF BREATH: 0
FEVER: 0
PALPITATIONS: 0
NAUSEA: 0
SINUS PAIN: 1
WHEEZING: 0

## 2025-08-15 ASSESSMENT — PATIENT HEALTH QUESTIONNAIRE - PHQ9
SUM OF ALL RESPONSES TO PHQ9 QUESTIONS 1 AND 2: 0
1. LITTLE INTEREST OR PLEASURE IN DOING THINGS: NOT AT ALL
2. FEELING DOWN, DEPRESSED OR HOPELESS: NOT AT ALL

## 2025-08-15 ASSESSMENT — PAIN SCALES - GENERAL: PAINLEVEL_OUTOF10: 6

## 2025-08-19 ENCOUNTER — APPOINTMENT (OUTPATIENT)
Dept: OTOLARYNGOLOGY | Facility: CLINIC | Age: 55
End: 2025-08-19
Payer: COMMERCIAL

## 2025-08-25 ENCOUNTER — OFFICE VISIT (OUTPATIENT)
Dept: URGENT CARE | Age: 55
End: 2025-08-25
Payer: COMMERCIAL

## 2025-08-25 ENCOUNTER — HOSPITAL ENCOUNTER (EMERGENCY)
Facility: HOSPITAL | Age: 55
Discharge: LEFT BEFORE DISCHARGE | End: 2025-08-25
Payer: COMMERCIAL

## 2025-08-25 VITALS
TEMPERATURE: 98.6 F | BODY MASS INDEX: 42.37 KG/M2 | HEART RATE: 57 BPM | SYSTOLIC BLOOD PRESSURE: 119 MMHG | HEIGHT: 70 IN | RESPIRATION RATE: 16 BRPM | OXYGEN SATURATION: 92 % | DIASTOLIC BLOOD PRESSURE: 73 MMHG | WEIGHT: 296 LBS

## 2025-08-25 DIAGNOSIS — I20.0 UNSTABLE ANGINA (MULTI): Primary | ICD-10-CM

## 2025-08-25 PROCEDURE — 3078F DIAST BP <80 MM HG: CPT | Performed by: PHYSICIAN ASSISTANT

## 2025-08-25 PROCEDURE — 93000 ELECTROCARDIOGRAM COMPLETE: CPT | Performed by: PHYSICIAN ASSISTANT

## 2025-08-25 PROCEDURE — 3008F BODY MASS INDEX DOCD: CPT | Performed by: PHYSICIAN ASSISTANT

## 2025-08-25 PROCEDURE — 99215 OFFICE O/P EST HI 40 MIN: CPT | Performed by: PHYSICIAN ASSISTANT

## 2025-08-25 PROCEDURE — 99282 EMERGENCY DEPT VISIT SF MDM: CPT

## 2025-08-25 PROCEDURE — 4500999001 HC ED NO CHARGE

## 2025-08-25 PROCEDURE — 3074F SYST BP LT 130 MM HG: CPT | Performed by: PHYSICIAN ASSISTANT

## 2025-08-25 ASSESSMENT — ENCOUNTER SYMPTOMS
SHORTNESS OF BREATH: 1
ARTHRALGIAS: 1
MYALGIAS: 1
WHEEZING: 0
PALPITATIONS: 0
STRIDOR: 0
COUGH: 0
CHEST TIGHTNESS: 0
CHOKING: 0
APNEA: 0

## 2025-08-26 ENCOUNTER — APPOINTMENT (OUTPATIENT)
Dept: CARDIOLOGY | Facility: HOSPITAL | Age: 55
End: 2025-08-26
Payer: COMMERCIAL

## 2025-08-26 ENCOUNTER — HOSPITAL ENCOUNTER (EMERGENCY)
Facility: HOSPITAL | Age: 55
Discharge: LEFT BEFORE DISCHARGE | End: 2025-08-26
Attending: EMERGENCY MEDICINE
Payer: COMMERCIAL

## 2025-08-26 ENCOUNTER — APPOINTMENT (OUTPATIENT)
Dept: RADIOLOGY | Facility: HOSPITAL | Age: 55
End: 2025-08-26
Payer: COMMERCIAL

## 2025-08-26 VITALS
SYSTOLIC BLOOD PRESSURE: 140 MMHG | HEART RATE: 54 BPM | BODY MASS INDEX: 42.37 KG/M2 | TEMPERATURE: 97.6 F | OXYGEN SATURATION: 96 % | WEIGHT: 296 LBS | DIASTOLIC BLOOD PRESSURE: 77 MMHG | RESPIRATION RATE: 25 BRPM | HEIGHT: 70 IN

## 2025-08-26 DIAGNOSIS — R07.9 CHEST PAIN, UNSPECIFIED TYPE: Primary | ICD-10-CM

## 2025-08-26 LAB
ALBUMIN SERPL BCP-MCNC: 3.9 G/DL (ref 3.4–5)
ALP SERPL-CCNC: 69 U/L (ref 33–120)
ALT SERPL W P-5'-P-CCNC: 17 U/L (ref 10–52)
ANION GAP SERPL CALC-SCNC: 10 MMOL/L (ref 10–20)
AST SERPL W P-5'-P-CCNC: 14 U/L (ref 9–39)
BASOPHILS # BLD AUTO: 0.06 X10*3/UL (ref 0–0.1)
BASOPHILS NFR BLD AUTO: 0.6 %
BILIRUB SERPL-MCNC: 0.4 MG/DL (ref 0–1.2)
BNP SERPL-MCNC: 20 PG/ML (ref 0–99)
BUN SERPL-MCNC: 13 MG/DL (ref 6–23)
CALCIUM SERPL-MCNC: 8.9 MG/DL (ref 8.6–10.3)
CARDIAC TROPONIN I PNL SERPL HS: 5 NG/L (ref 0–20)
CARDIAC TROPONIN I PNL SERPL HS: 6 NG/L (ref 0–20)
CHLORIDE SERPL-SCNC: 103 MMOL/L (ref 98–107)
CO2 SERPL-SCNC: 26 MMOL/L (ref 21–32)
CREAT SERPL-MCNC: 0.76 MG/DL (ref 0.5–1.3)
EGFRCR SERPLBLD CKD-EPI 2021: >90 ML/MIN/1.73M*2
EOSINOPHIL # BLD AUTO: 0.4 X10*3/UL (ref 0–0.7)
EOSINOPHIL NFR BLD AUTO: 4.1 %
ERYTHROCYTE [DISTWIDTH] IN BLOOD BY AUTOMATED COUNT: 12.6 % (ref 11.5–14.5)
GLUCOSE SERPL-MCNC: 145 MG/DL (ref 74–99)
HCT VFR BLD AUTO: 46 % (ref 41–52)
HGB BLD-MCNC: 15.7 G/DL (ref 13.5–17.5)
IMM GRANULOCYTES # BLD AUTO: 0.04 X10*3/UL (ref 0–0.7)
IMM GRANULOCYTES NFR BLD AUTO: 0.4 % (ref 0–0.9)
INR PPP: 1 (ref 0.9–1.1)
LYMPHOCYTES # BLD AUTO: 2.96 X10*3/UL (ref 1.2–4.8)
LYMPHOCYTES NFR BLD AUTO: 30.6 %
MCH RBC QN AUTO: 29.9 PG (ref 26–34)
MCHC RBC AUTO-ENTMCNC: 34.1 G/DL (ref 32–36)
MCV RBC AUTO: 88 FL (ref 80–100)
MONOCYTES # BLD AUTO: 0.77 X10*3/UL (ref 0.1–1)
MONOCYTES NFR BLD AUTO: 8 %
NEUTROPHILS # BLD AUTO: 5.44 X10*3/UL (ref 1.2–7.7)
NEUTROPHILS NFR BLD AUTO: 56.3 %
NRBC BLD-RTO: 0 /100 WBCS (ref 0–0)
PLATELET # BLD AUTO: 297 X10*3/UL (ref 150–450)
POTASSIUM SERPL-SCNC: 4.2 MMOL/L (ref 3.5–5.3)
PROT SERPL-MCNC: 6.7 G/DL (ref 6.4–8.2)
PROTHROMBIN TIME: 11.3 SECONDS (ref 9.8–12.4)
RBC # BLD AUTO: 5.25 X10*6/UL (ref 4.5–5.9)
SODIUM SERPL-SCNC: 135 MMOL/L (ref 136–145)
WBC # BLD AUTO: 9.7 X10*3/UL (ref 4.4–11.3)

## 2025-08-26 PROCEDURE — 80053 COMPREHEN METABOLIC PANEL: CPT

## 2025-08-26 PROCEDURE — 99285 EMERGENCY DEPT VISIT HI MDM: CPT | Mod: 25 | Performed by: EMERGENCY MEDICINE

## 2025-08-26 PROCEDURE — 83880 ASSAY OF NATRIURETIC PEPTIDE: CPT

## 2025-08-26 PROCEDURE — 36415 COLL VENOUS BLD VENIPUNCTURE: CPT

## 2025-08-26 PROCEDURE — 84484 ASSAY OF TROPONIN QUANT: CPT

## 2025-08-26 PROCEDURE — 71046 X-RAY EXAM CHEST 2 VIEWS: CPT

## 2025-08-26 PROCEDURE — 71046 X-RAY EXAM CHEST 2 VIEWS: CPT | Performed by: RADIOLOGY

## 2025-08-26 PROCEDURE — 85610 PROTHROMBIN TIME: CPT

## 2025-08-26 PROCEDURE — 85025 COMPLETE CBC W/AUTO DIFF WBC: CPT

## 2025-08-26 PROCEDURE — 93005 ELECTROCARDIOGRAM TRACING: CPT

## 2025-08-26 ASSESSMENT — PAIN SCALES - GENERAL
PAINLEVEL_OUTOF10: 0 - NO PAIN
PAINLEVEL_OUTOF10: 1
PAINLEVEL_OUTOF10: 3

## 2025-08-26 ASSESSMENT — PAIN DESCRIPTION - PAIN TYPE: TYPE: ACUTE PAIN

## 2025-08-26 ASSESSMENT — PAIN - FUNCTIONAL ASSESSMENT: PAIN_FUNCTIONAL_ASSESSMENT: 0-10

## 2025-08-26 ASSESSMENT — PAIN DESCRIPTION - ORIENTATION: ORIENTATION: LEFT

## 2025-08-26 ASSESSMENT — PAIN DESCRIPTION - DESCRIPTORS: DESCRIPTORS: ACHING

## 2025-08-26 ASSESSMENT — PAIN DESCRIPTION - LOCATION: LOCATION: CHEST

## 2025-08-27 ENCOUNTER — OFFICE VISIT (OUTPATIENT)
Dept: OTOLARYNGOLOGY | Facility: CLINIC | Age: 55
End: 2025-08-27
Payer: COMMERCIAL

## 2025-08-27 VITALS — BODY MASS INDEX: 42.47 KG/M2 | WEIGHT: 296 LBS

## 2025-08-27 DIAGNOSIS — H61.22 IMPACTED CERUMEN OF LEFT EAR: Primary | ICD-10-CM

## 2025-08-27 DIAGNOSIS — H69.93 ETD (EUSTACHIAN TUBE DYSFUNCTION), BILATERAL: ICD-10-CM

## 2025-08-27 DIAGNOSIS — H93.8X1 SENSATION OF FULLNESS IN RIGHT EAR: ICD-10-CM

## 2025-08-27 DIAGNOSIS — R00.2 PALPITATIONS: Primary | ICD-10-CM

## 2025-08-27 DIAGNOSIS — H65.22 LEFT CHRONIC SEROUS OTITIS MEDIA: ICD-10-CM

## 2025-08-27 DIAGNOSIS — H71.92 CHOLESTEATOMA OF LEFT EAR: ICD-10-CM

## 2025-08-27 LAB
ATRIAL RATE: 60 BPM
P AXIS: 4 DEGREES
P OFFSET: 175 MS
P ONSET: 139 MS
PR INTERVAL: 166 MS
Q ONSET: 222 MS
QRS COUNT: 10 BEATS
QRS DURATION: 86 MS
QT INTERVAL: 384 MS
QTC CALCULATION(BAZETT): 384 MS
QTC FREDERICIA: 384 MS
R AXIS: 37 DEGREES
T AXIS: 83 DEGREES
T OFFSET: 414 MS
VENTRICULAR RATE: 60 BPM

## 2025-08-27 PROCEDURE — 99213 OFFICE O/P EST LOW 20 MIN: CPT | Performed by: NURSE PRACTITIONER

## 2025-08-27 PROCEDURE — 69210 REMOVE IMPACTED EAR WAX UNI: CPT | Performed by: NURSE PRACTITIONER

## 2025-08-27 PROCEDURE — 1036F TOBACCO NON-USER: CPT | Performed by: NURSE PRACTITIONER

## 2025-08-27 ASSESSMENT — PATIENT HEALTH QUESTIONNAIRE - PHQ9
2. FEELING DOWN, DEPRESSED OR HOPELESS: NOT AT ALL
1. LITTLE INTEREST OR PLEASURE IN DOING THINGS: NOT AT ALL
SUM OF ALL RESPONSES TO PHQ9 QUESTIONS 1 AND 2: 0

## 2025-08-29 ENCOUNTER — APPOINTMENT (OUTPATIENT)
Dept: OTOLARYNGOLOGY | Facility: CLINIC | Age: 55
End: 2025-08-29
Payer: COMMERCIAL

## 2025-09-06 ENCOUNTER — OFFICE VISIT (OUTPATIENT)
Dept: URGENT CARE | Age: 55
End: 2025-09-06
Payer: COMMERCIAL

## 2025-09-06 VITALS
TEMPERATURE: 98 F | RESPIRATION RATE: 16 BRPM | OXYGEN SATURATION: 94 % | HEART RATE: 69 BPM | DIASTOLIC BLOOD PRESSURE: 73 MMHG | SYSTOLIC BLOOD PRESSURE: 123 MMHG

## 2025-09-06 DIAGNOSIS — J01.00 ACUTE NON-RECURRENT MAXILLARY SINUSITIS: Primary | ICD-10-CM

## 2025-09-06 PROCEDURE — 3074F SYST BP LT 130 MM HG: CPT

## 2025-09-06 PROCEDURE — 99070 SPECIAL SUPPLIES PHYS/QHP: CPT

## 2025-09-06 PROCEDURE — 3078F DIAST BP <80 MM HG: CPT

## 2025-09-06 PROCEDURE — 99213 OFFICE O/P EST LOW 20 MIN: CPT

## 2025-09-06 RX ORDER — AMOXICILLIN AND CLAVULANATE POTASSIUM 875; 125 MG/1; MG/1
1 TABLET, FILM COATED ORAL 2 TIMES DAILY
Qty: 14 TABLET | Refills: 0 | Status: SHIPPED | OUTPATIENT
Start: 2025-09-06 | End: 2025-09-13

## 2025-09-06 ASSESSMENT — ENCOUNTER SYMPTOMS
SINUS PRESSURE: 1
SINUS COMPLAINT: 1
SINUS PAIN: 1

## 2025-09-08 ENCOUNTER — APPOINTMENT (OUTPATIENT)
Facility: CLINIC | Age: 55
End: 2025-09-08
Payer: COMMERCIAL

## 2025-09-15 ENCOUNTER — APPOINTMENT (OUTPATIENT)
Dept: PRIMARY CARE | Facility: CLINIC | Age: 55
End: 2025-09-15
Payer: COMMERCIAL

## 2025-10-17 ENCOUNTER — APPOINTMENT (OUTPATIENT)
Dept: PODIATRY | Facility: CLINIC | Age: 55
End: 2025-10-17
Payer: COMMERCIAL

## 2025-10-23 ENCOUNTER — APPOINTMENT (OUTPATIENT)
Dept: PODIATRY | Facility: CLINIC | Age: 55
End: 2025-10-23
Payer: COMMERCIAL

## 2025-11-28 ENCOUNTER — APPOINTMENT (OUTPATIENT)
Dept: OTOLARYNGOLOGY | Facility: CLINIC | Age: 55
End: 2025-11-28
Payer: COMMERCIAL